# Patient Record
Sex: FEMALE | Race: WHITE | NOT HISPANIC OR LATINO | Employment: UNEMPLOYED | URBAN - METROPOLITAN AREA
[De-identification: names, ages, dates, MRNs, and addresses within clinical notes are randomized per-mention and may not be internally consistent; named-entity substitution may affect disease eponyms.]

---

## 2021-01-29 ENCOUNTER — OFFICE VISIT (OUTPATIENT)
Dept: OBSTETRICS AND GYNECOLOGY | Facility: CLINIC | Age: 36
End: 2021-01-29
Payer: COMMERCIAL

## 2021-01-29 VITALS
SYSTOLIC BLOOD PRESSURE: 120 MMHG | BODY MASS INDEX: 42.59 KG/M2 | DIASTOLIC BLOOD PRESSURE: 72 MMHG | WEIGHT: 265 LBS | HEIGHT: 66 IN

## 2021-01-29 DIAGNOSIS — Z01.419 WELL WOMAN EXAM WITH ROUTINE GYNECOLOGICAL EXAM: ICD-10-CM

## 2021-01-29 DIAGNOSIS — N93.9 ABNORMAL UTERINE BLEEDING (AUB): Primary | ICD-10-CM

## 2021-01-29 DIAGNOSIS — Z11.3 ROUTINE SCREENING FOR STI (SEXUALLY TRANSMITTED INFECTION): ICD-10-CM

## 2021-01-29 DIAGNOSIS — Z12.31 BREAST CANCER SCREENING BY MAMMOGRAM: ICD-10-CM

## 2021-01-29 PROCEDURE — 99385 PREV VISIT NEW AGE 18-39: CPT | Performed by: OBSTETRICS & GYNECOLOGY

## 2021-01-29 RX ORDER — PANTOPRAZOLE SODIUM 40 MG/1
40 TABLET, DELAYED RELEASE ORAL DAILY
COMMUNITY
End: 2024-01-30 | Stop reason: HOSPADM

## 2021-01-29 NOTE — PROGRESS NOTES
Visit Date: 1/29/2021   Anthony Tate is 35 y.o. female presenting today for Annual GYN Exam    HPI:   Last Menstrual Period: Patient's last menstrual period was 01/24/2021 (exact date).  Menstrual Cycle: Menstrual cycle is Regular. Approximate interval of 21-23 days. Bleeding lasts 5-6 days. Bleeding is Heavy. Patient has no bleeding <21 days. Patient does not have painful cramping during her periods.   Sexually Activity: Patient is sexually active with difficulty. Vaginal dryness, decreased lubrication, decreased libido. Recently found out  had an affair so also concerned about STI exposure and wants testing.  Contraception: Patient is using nothing for contraception and  is ok with this.  Bowel and Bladder function: Normal per patient.  Pap Smears: does not have a history of abnormal pap smears, Last pap was possible 2019 but is not sure.  Patient is known to be BRCA 1, tested due to maternal hx of breast cancer dx at age 39. Has not had a mammogram or MRI in several years.  Patient does have a family history of breast or gyn cancers.  Patient  does desire screening for STIs today.   Patient does not have a history of Domestic Violence/Verbal Abuse/Sexual Assault. She does feel safe in her current environment.     Past Medical History:  has a past medical history of BRCA positive, Breast cancer screening by mammogram (06/2019), GERD (gastroesophageal reflux disease), and Pap smear for cervical cancer screening (09/2019).    Past Surgical History:  has no past surgical history on file.    Medications:   Current Outpatient Medications:   •  pantoprazole (PROTONIX) 40 mg EC tablet, Take 40 mg by mouth daily., Disp: , Rfl:   •  MAGNESIUM ORAL, Take by mouth., Disp: , Rfl:   •  POTASSIUM ORAL, Take by mouth., Disp: , Rfl:       Allergies: has No Known Allergies.     Family History: family history includes Breast cancer (age of onset: 39) in her biological mother; Heart disease in her maternal grandfather and  "paternal grandfather; Ovarian cancer in her mother's sister; Skin cancer (age of onset: 40) in her biological mother; Thyroid cancer (age of onset: 45) in her biological mother.    Social History:  reports that she has quit smoking. She has never used smokeless tobacco. She reports previous alcohol use. She reports that she does not use drugs.    Review of Systems  Constitutional:   No hot flashes.   No night sweats.   No unexpected weight changes.  HENT:   No oral ulcers.   No headaches related to menstrual cycle.   Breasts:   No changes to skin of the breast or nipple.   No nipple discharge.   No breast lumps/cysts.   No breast pain.   Patient has not noticed any changes to breasts.   Respiratory:   No shortness of breath.  Cardiovascular:   No chest pain  Gastrointestinal:   No changes in bowel habits.  Genitourinary:   No pain with urination.   + urgency with urination.   No increased frequency of urination.   No urinary incontinence.   No vaginal discharge.   + vaginal dryness  No painful intercourse.   No genital sores.   No irregular menstrual cycles.   No heavy menstrual cycles.   No painful menstrual periods.   No bleeding between menstrual cycles.   + bleeding after intercourse sometimes  No absence of menstrual periods.  Integument:   No changes to any existing genital skin lesions or moles.   No new vaginal skin lesions or moles.   No genital rashes.   Endocrine:   No increased hair growth   Psychiatric:   No anxiety.   No depression.   No suicidal ideation.   Heme-Lymph:   No easy bruising.   No unexplained lumps.        Visit Vitals  /72 (BP Location: Left upper arm, Patient Position: Sitting)   Ht 1.676 m (5' 6\")   Wt 120 kg (265 lb)   LMP 01/24/2021 (Exact Date)   BMI 42.77 kg/m²       OBGyn Exam  General Appearance: Alert, cooperative, no acute distress  Head: Normocephalic, without obvious abnormality  Breast: Breasts: breasts appear normal, no suspicious masses, no skin or nipple changes or " axillary nodes.  Abdomen: Soft, nontender, nondistended,no masses, no organomegaly  Pelvic exam: VULVA: normal appearing vulva with no masses, tenderness or lesions. VAGINA: normal appearing vagina with normal color and discharge, no lesions. CERVIX: normal appearing cervix without discharge or lesions. UTERUS: uterus is normal size, shape, consistency and non-tender. ADNEXA: normal adnexa in size, nontender and no masses.    Assessment and Plan:  35 y.o. yo presents for an annual exam.   - Pap  with HPV collected today.  - Contraception: Patient not using contraception, recommend condoms for STI prevention as well. Discussed 's recent affair and she wants screening for all possible STIs.  GC/CT/Trich collected. HIV/RP/Hep B/C ordered to get at lab.   - AUB: reports periods fairly close together 21-23 days apart but severely heavy bleeding that is flooding. This has been ongoing since the birth of her last child. Will get pelvic US to assess for any abnormal structural lesions.   - BRCA 1 mutation: has not had any breast imaging done in a few years. Recommended mammogram ASAP and then staggering q6 months with breast MRI. She has a breast cancer doctor she sees, I ordered her mammo and asked her to follow up with them.   - Also discussed possibility of RR oophorectomy if she is completed child bearing. She is going to think about this and we will discuss further at next visit.   - Decreased libido: discussed multifactorial nature: relationship with  is having issues given recent affair, recommended therapy and jabier francisca when she is ready to use this. Also recommended lubricant for increased comfort during sex.   - PCB: occasional, not every encounter. Various etiologies of PCB include cytologic abnormalities of cervix, infectious causes, and trauma to tissue. Pap smear collected today to ensure no cytologic atypia. Swab collected to assess for infectious etiologies. No obvious trauma on exam.  - Patient  to call to schedule telemedicine visit in 6 weeks after US is completed    Meghna Strong, DO

## 2021-01-30 LAB
HBV SURFACE AG SERPL QL IA: NEGATIVE
HCV AB S/CO SERPL IA: <0.1 S/CO RATIO (ref 0–0.9)
HIV 1+2 AB+HIV1 P24 AG SERPL QL IA: NON REACTIVE

## 2021-02-01 LAB — TREPONEMA PALLIDUM IGG+IGM AB [PRESENCE] IN SERUM OR PLASMA BY IMMUNOASSAY: NON REACTIVE

## 2021-02-02 LAB
A VAGINAE DNA VAG QL NAA+PROBE: ABNORMAL SCORE
BVAB2 DNA VAG QL NAA+PROBE: ABNORMAL SCORE
C ALBICANS DNA VAG QL NAA+PROBE: NEGATIVE
C GLABRATA DNA VAG QL NAA+PROBE: POSITIVE
C TRACH DNA VAG QL NAA+PROBE: NEGATIVE
MEGA1 DNA VAG QL NAA+PROBE: ABNORMAL SCORE
N GONORRHOEA DNA VAG QL NAA+PROBE: NEGATIVE
T VAGINALIS DNA VAG QL NAA+PROBE: NEGATIVE

## 2021-02-03 LAB
CYTOLOGIST CVX/VAG CYTO: NORMAL
CYTOLOGY CVX/VAG DOC CYTO: NORMAL
CYTOLOGY CVX/VAG DOC THIN PREP: NORMAL
DX ICD CODE: NORMAL
HPV I/H RISK 4 DNA CVX QL PROBE+SIG AMP: NEGATIVE
LAB CORP NOTE:: NORMAL
OTHER STN SPEC: NORMAL
STAT OF ADQ CVX/VAG CYTO-IMP: NORMAL

## 2021-02-05 ENCOUNTER — HOSPITAL ENCOUNTER (OUTPATIENT)
Dept: RADIOLOGY | Facility: CLINIC | Age: 36
Discharge: HOME | End: 2021-02-05
Attending: OBSTETRICS & GYNECOLOGY
Payer: COMMERCIAL

## 2021-02-05 DIAGNOSIS — N93.9 ABNORMAL UTERINE BLEEDING (AUB): ICD-10-CM

## 2021-02-05 DIAGNOSIS — Z12.31 BREAST CANCER SCREENING BY MAMMOGRAM: ICD-10-CM

## 2021-02-05 PROCEDURE — 76830 TRANSVAGINAL US NON-OB: CPT

## 2021-02-05 PROCEDURE — 77067 SCR MAMMO BI INCL CAD: CPT

## 2021-02-09 ENCOUNTER — TRANSCRIBE ORDERS (OUTPATIENT)
Dept: REGISTRATION | Facility: CLINIC | Age: 36
End: 2021-02-09

## 2021-02-09 DIAGNOSIS — R92.8 OTHER ABNORMAL AND INCONCLUSIVE FINDINGS ON DIAGNOSTIC IMAGING OF BREAST: Primary | ICD-10-CM

## 2021-02-15 ENCOUNTER — HOSPITAL ENCOUNTER (OUTPATIENT)
Dept: RADIOLOGY | Facility: CLINIC | Age: 36
Discharge: HOME | End: 2021-02-15
Attending: RADIOLOGY
Payer: COMMERCIAL

## 2021-02-15 DIAGNOSIS — R92.8 OTHER ABNORMAL AND INCONCLUSIVE FINDINGS ON DIAGNOSTIC IMAGING OF BREAST: ICD-10-CM

## 2021-02-15 PROCEDURE — 77065 DX MAMMO INCL CAD UNI: CPT | Mod: RT

## 2021-02-15 PROCEDURE — 76642 ULTRASOUND BREAST LIMITED: CPT | Mod: RT

## 2021-02-18 ENCOUNTER — TELEMEDICINE (OUTPATIENT)
Dept: OBSTETRICS AND GYNECOLOGY | Facility: CLINIC | Age: 36
End: 2021-02-18
Payer: COMMERCIAL

## 2021-02-18 VITALS — BODY MASS INDEX: 41.46 KG/M2 | WEIGHT: 258 LBS | HEIGHT: 66 IN

## 2021-02-18 DIAGNOSIS — N93.9 ABNORMAL UTERINE BLEEDING (AUB): Primary | ICD-10-CM

## 2021-02-18 DIAGNOSIS — Z15.01 BRCA1 GENETIC CARRIER: ICD-10-CM

## 2021-02-18 DIAGNOSIS — Z15.09 BRCA1 GENETIC CARRIER: ICD-10-CM

## 2021-02-18 PROCEDURE — 99212 OFFICE O/P EST SF 10 MIN: CPT | Mod: 95 | Performed by: OBSTETRICS & GYNECOLOGY

## 2021-02-18 NOTE — PROGRESS NOTES
Verification of Patient Location:  The patient affirms they are currently located in the following state:[place]     Request for Consent:  You and I are about to have a telemedicine check-in or visit. This is allowed because you are already my patient, and you have requested it.  This telemedicine visit will be billed to your health insurance or you, if you are self-insured.  You understand you will be responsible for any copayments or coinsurances that apply to your telemedicine visit.  Before starting our telemedicine visit, I am required to get your consent for this virtual check-in or visit by telemedicine. Do you consent?      Patient Response to Request for Consent: Yes    The following have been reviewed and updated as appropriate in this visit:  Tobacco  Allergies  Meds  Problems  Med Hx  Surg Hx  Fam Hx  Soc Hx          Visit Documentation:    Patient ID: Anthony Tate   : 1985 35 y.o.  MRN: [id number]   Visit Date: 2021    Subjective   Anthony Tate is presenting today for follow up of Results    After annual visit, several issues were addressed and needing follow up today.     First, patient was told by  about an affair so STI testing was ordered. This was all negative which was reassuring to patient. We did discuss the time for seroconversion for blood STIs however she states the affair ended last March, and so her exposure would have been almost a year ago. At this point would only retest if she is told of a new exposure or wants routine screening.     Re: BRCA1 history: Had screening mammogram which was inconclusive. Diagnostic then showed two small complex breast cysts. US consistent with these findings. Recommendation from breast center radiologist was a 6 month follow up. This also falls in the window of when she should have a breast MRI done anyway due to BRCA status. Anthony hasn't seen her breast doctors in several years, but plans to return to their  care. (Previously saw Dr. Kateryna Riley who reportedly is no longer in the practice). I encouraged her to call so she can be set up for MRI in August 2021, and if she is unable to get into their office needs to let me know so I can refer her to breast onc here at [facility].     Re: menorrhagia: Pelvic US consistent with probable adenomyosis. EMS 13 mm. Given BMI 42, endometrial sampling is warranted to r/o hyperplasia as alternative cause of AUB. We discussed options for office EMB vs D&C hysteroscopy. She is comfortable proceeding with an office EMB. We will schedule this in the next several weeks. We also reviewed that pending results, would discuss management options for AUB. Will further address BRCA status and RR BSO once atypical hyperplasia is ruled out.     Patient comfortable with plan.     Meghna Strong DO            Time Spent:  I spent 23 minutes on this date of service performing the following activities: obtaining history, documenting, preparing for visit, obtaining / reviewing records, providing counseling and education, independently reviewing study/studies, communicating results and coordinating care.

## 2021-03-15 ENCOUNTER — PROCEDURE VISIT (OUTPATIENT)
Dept: OBSTETRICS AND GYNECOLOGY | Facility: CLINIC | Age: 36
End: 2021-03-15
Payer: COMMERCIAL

## 2021-03-15 VITALS
DIASTOLIC BLOOD PRESSURE: 64 MMHG | SYSTOLIC BLOOD PRESSURE: 118 MMHG | BODY MASS INDEX: 40.82 KG/M2 | HEIGHT: 66 IN | WEIGHT: 254 LBS

## 2021-03-15 DIAGNOSIS — N93.9 ABNORMAL UTERINE BLEEDING (AUB): Primary | ICD-10-CM

## 2021-03-15 LAB — PREGNANCY TEST URINE, POC: NEGATIVE

## 2021-03-15 PROCEDURE — 81025 URINE PREGNANCY TEST: CPT | Performed by: OBSTETRICS & GYNECOLOGY

## 2021-03-15 PROCEDURE — 58100 BIOPSY OF UTERUS LINING: CPT | Performed by: OBSTETRICS & GYNECOLOGY

## 2021-03-15 RX ORDER — TERCONAZOLE 4 MG/G
1 CREAM VAGINAL NIGHTLY
Qty: 45 G | Refills: 0 | Status: SHIPPED | OUTPATIENT
Start: 2021-03-15 | End: 2021-03-22

## 2021-03-15 NOTE — PROCEDURES
BX Endometrial    Date/Time: 3/15/2021 3:21 PM  Performed by: Hector Waters MD  Authorized by: Hector Waters MD     Consent:     Consent obtained:  Verbal and written    Consent given by:  Patient    Patient agrees, verbalizes understanding, and wants to proceed: yes    Procedure:     Uterus sound depth (cm):  7    Comments:     Procedure comments:  Patient presents for an endometrial biopsy per her plan with Dr. Strong.  Patient was comfortable proceeding.  Consent signed.  Negative pregnancy test was done here.  Pipelle performed without difficulty.  Uterine cavity sounded to 7 cm.  Patient tolerated the procedure well.  Specimen to pathology.  Will reassess management plan after results are known.  Instructions reviewed with the patient.    Patient was treated for Candida glabrata with 2 doses of Diflucan.  She had temporary relief of her symptoms but they quickly recurred.  Will treat with Terazol 7 once she has stopped bleeding from this procedure.  Patient understands and is comfortable with this plan.  Will reassess if symptoms recur.    NAB

## 2021-03-22 LAB
DX ICD CODE: NORMAL
PATH REPORT.FINAL DX SPEC: NORMAL
PATH REPORT.GROSS SPEC: NORMAL
PATH REPORT.SITE OF ORIGIN SPEC: NORMAL
PATHOLOGIST NAME: NORMAL
PAYMENT PROCEDURE: NORMAL

## 2022-12-21 ENCOUNTER — TELEPHONE (OUTPATIENT)
Dept: OBSTETRICS AND GYNECOLOGY | Facility: CLINIC | Age: 37
End: 2022-12-21
Payer: COMMERCIAL

## 2022-12-21 DIAGNOSIS — Z12.31 BREAST CANCER SCREENING BY MAMMOGRAM: Primary | ICD-10-CM

## 2022-12-21 DIAGNOSIS — R92.8 ABNORMAL MAMMOGRAM OF RIGHT BREAST: ICD-10-CM

## 2022-12-21 NOTE — LETTER
[facility]  [facility] at [place] [address]  [place] [address]  Phone:  [phone]  Fax:  [phone]    [facility] Account Number - [id number]  [facility] Account Number - [id number]      Patient:    Anthony Tate MRN:  [id number]   [address]  [place] [address] :  1985  Sex:  F   Phone: [phone]      INSURANCE PAYOR PLAN GROUP # SUBSCRIBER ID   Primary:   [employer] [id number]  Insurance: N/A  Plan Name: N/A [id number]  [id number]     Order Date:  Dec 21, 2022             BI DIAGNOSTIC MAMMOGRAM BILATERAL (TOMOSYNTHESIS)  CPT: 34736   (Order ID: [id number])   Diagnosis:  Breast cancer screening by mammogram (Z12.31)  Abnormal mammogram of right breast (R92.8)   Priority:  Routine  Release to patient: Immediate                       Authorized by: Minoo Arias MD   (NPI: [id number])    E-Signature: Minoo Arias MD                       To schedule radiology appointments with [facility]     call Central Scheduling at [phone]  To schedule PET scans call PET Scheduling [phone]    To schedule Low-Dose CT Scan for Lung Cancer Screening  call [phone]        Lab draws do not require an appointment

## 2022-12-21 NOTE — TELEPHONE ENCOUNTER
Last Mammo: approximate date 2/2021 and was abnormal: BIRADS 3  Last OV: 1/29/2021  Next OV: 3/17/2023    Mammo ordered per pt request.  Colleen Alcaraz RN

## 2023-01-23 ENCOUNTER — TELEPHONE (OUTPATIENT)
Dept: OBSTETRICS AND GYNECOLOGY | Facility: CLINIC | Age: 38
End: 2023-01-23
Payer: COMMERCIAL

## 2023-01-23 NOTE — TELEPHONE ENCOUNTER
Pt called to say that she never rec'd mammo rx that was requested to be mailed out. Another rx was printed and mailed after verifying pt address

## 2023-02-22 ENCOUNTER — HOSPITAL ENCOUNTER (OUTPATIENT)
Dept: RADIOLOGY | Age: 38
Discharge: HOME | End: 2023-02-22
Attending: OBSTETRICS & GYNECOLOGY
Payer: COMMERCIAL

## 2023-02-22 ENCOUNTER — HOSPITAL ENCOUNTER (OUTPATIENT)
Dept: RADIOLOGY | Age: 38
Discharge: HOME | End: 2023-02-22
Attending: RADIOLOGY
Payer: COMMERCIAL

## 2023-02-22 DIAGNOSIS — R92.8 ABNORMAL MAMMOGRAM OF RIGHT BREAST: ICD-10-CM

## 2023-02-22 DIAGNOSIS — Z12.31 BREAST CANCER SCREENING BY MAMMOGRAM: ICD-10-CM

## 2023-02-22 PROCEDURE — 76642 ULTRASOUND BREAST LIMITED: CPT | Mod: 50

## 2023-02-22 PROCEDURE — 77066 DX MAMMO INCL CAD BI: CPT

## 2023-03-13 ENCOUNTER — APPOINTMENT (RX ONLY)
Dept: URBAN - METROPOLITAN AREA CLINIC 28 | Facility: CLINIC | Age: 38
Setting detail: DERMATOLOGY
End: 2023-03-13

## 2023-03-13 DIAGNOSIS — L91.8 OTHER HYPERTROPHIC DISORDERS OF THE SKIN: ICD-10-CM

## 2023-03-13 DIAGNOSIS — L72.0 EPIDERMAL CYST: ICD-10-CM

## 2023-03-13 DIAGNOSIS — L57.0 ACTINIC KERATOSIS: ICD-10-CM

## 2023-03-13 DIAGNOSIS — L24.9 IRRITANT CONTACT DERMATITIS, UNSPECIFIED CAUSE: ICD-10-CM | Status: INADEQUATELY CONTROLLED

## 2023-03-13 PROCEDURE — 17003 DESTRUCT PREMALG LES 2-14: CPT | Mod: 59

## 2023-03-13 PROCEDURE — ? COUNSELING

## 2023-03-13 PROCEDURE — 99204 OFFICE O/P NEW MOD 45 MIN: CPT | Mod: 25

## 2023-03-13 PROCEDURE — 17110 DESTRUCTION B9 LES UP TO 14: CPT

## 2023-03-13 PROCEDURE — 17000 DESTRUCT PREMALG LESION: CPT | Mod: 59

## 2023-03-13 PROCEDURE — ? LIQUID NITROGEN

## 2023-03-13 PROCEDURE — ? ACNE SURGERY

## 2023-03-13 PROCEDURE — ? PRESCRIPTION MEDICATION MANAGEMENT

## 2023-03-13 PROCEDURE — ? BENIGN DESTRUCTION

## 2023-03-13 PROCEDURE — 10040 EXTRACTION: CPT

## 2023-03-13 ASSESSMENT — LOCATION DETAILED DESCRIPTION DERM
LOCATION DETAILED: LEFT MEDIAL CANTHUS
LOCATION DETAILED: RIGHT INFERIOR VERMILION LIP
LOCATION DETAILED: LEFT INFERIOR VERMILION LIP
LOCATION DETAILED: LEFT MEDIAL EYEBROW

## 2023-03-13 ASSESSMENT — LOCATION ZONE DERM
LOCATION ZONE: LIP
LOCATION ZONE: EYELID
LOCATION ZONE: FACE

## 2023-03-13 ASSESSMENT — LOCATION SIMPLE DESCRIPTION DERM
LOCATION SIMPLE: LEFT EYELID
LOCATION SIMPLE: LEFT EYEBROW
LOCATION SIMPLE: RIGHT LIP
LOCATION SIMPLE: LEFT LIP

## 2023-03-13 NOTE — PROCEDURE: ACNE SURGERY
Extraction Method: comedo extractor
Acne Type: Cysts
Detail Level: Zone
Consent was obtained and risks were reviewed including but not limited to scarring, infection, bleeding, scabbing, incomplete removal, and allergy to anesthesia.
Post-Care Instructions: I reviewed with the patient in detail post-care instructions. Patient is to keep the treatment areas dry overnight, and then apply bacitracin twice daily until healed. Patient may apply hydrogen peroxide soaks to remove any crusting.
Render Post-Care Instructions In Note?: no
Prep Text (Optional): Prior to removal the treatment areas were prepped in the usual fashion.
Render Number Of Lesions Treated: yes
- - -

## 2023-03-13 NOTE — PROCEDURE: BENIGN DESTRUCTION
Anesthesia Volume In Cc: 0.5
Detail Level: Detailed
Post-Care Instructions: I reviewed with the patient in detail post-care instructions. Patient is to wear sunprotection, and avoid picking at any of the treated lesions. Pt may apply Vaseline to crusted or scabbing areas.
Include Z78.9 (Other Specified Conditions Influencing Health Status) As An Associated Diagnosis?: No
Treatment Number (Will Not Render If 0): 0
Consent: The patient's consent was obtained including but not limited to risks of crusting, scabbing, blistering, scarring, darker or lighter pigmentary change, recurrence, incomplete removal and infection.
Medical Necessity Clause: This procedure was medically necessary because the lesions that were treated were:
Medical Necessity Information: It is in your best interest to select a reason for this procedure from the list below. All of these items fulfill various CMS LCD requirements except the new and changing color options.

## 2023-03-13 NOTE — PROCEDURE: PRESCRIPTION MEDICATION MANAGEMENT
Detail Level: Zone
Render In Strict Bullet Format?: No
Plan: T/C wetting lips and using plain Vaseline

## 2023-03-17 ENCOUNTER — OFFICE VISIT (OUTPATIENT)
Dept: OBSTETRICS AND GYNECOLOGY | Facility: CLINIC | Age: 38
End: 2023-03-17
Payer: COMMERCIAL

## 2023-03-17 VITALS
WEIGHT: 245 LBS | HEIGHT: 66 IN | SYSTOLIC BLOOD PRESSURE: 124 MMHG | BODY MASS INDEX: 39.37 KG/M2 | DIASTOLIC BLOOD PRESSURE: 60 MMHG

## 2023-03-17 DIAGNOSIS — Z15.01 BRCA1 POSITIVE: ICD-10-CM

## 2023-03-17 DIAGNOSIS — N92.0 MENORRHAGIA WITH REGULAR CYCLE: ICD-10-CM

## 2023-03-17 DIAGNOSIS — Z15.09 BRCA1 POSITIVE: ICD-10-CM

## 2023-03-17 DIAGNOSIS — Z01.419 WELL WOMAN EXAM WITH ROUTINE GYNECOLOGICAL EXAM: Primary | ICD-10-CM

## 2023-03-17 DIAGNOSIS — Z12.31 BREAST CANCER SCREENING BY MAMMOGRAM: ICD-10-CM

## 2023-03-17 PROCEDURE — 99213 OFFICE O/P EST LOW 20 MIN: CPT | Performed by: OBSTETRICS & GYNECOLOGY

## 2023-03-17 PROCEDURE — 99395 PREV VISIT EST AGE 18-39: CPT | Mod: 25 | Performed by: OBSTETRICS & GYNECOLOGY

## 2023-03-17 PROCEDURE — 3008F BODY MASS INDEX DOCD: CPT | Performed by: OBSTETRICS & GYNECOLOGY

## 2023-03-17 NOTE — PROGRESS NOTES
Visit Date: 3/17/2023   Anthony Tate is 37 y.o. female presenting today for Annual GYN Exam    HPI:  Last Menstrual Period: Patient's last menstrual period was 03/05/2023.  Menstrual Cycle: Menstrual cycle is Regular. Approximate interval of 28-30 days. Bleeding lasts 6-7 days. Bleeding isvery Heavy. Changes a pad or tampon every time she urinates, every 1-2 hrs. Patient has no bleeding <21 days. Patient does not have painful cramping during her periods.   Sexually Activity: not at this time   Bowel and Bladder function: Normal per patient.   Pap Smears: does not have a history of abnormal pap smears, Last pap was in 2/2021 and was neg/neg.   Patient  does report left lateral breast pain that occurred recently, however following her breast ultrasound the pain spontaneously improved.  Patient does have a family history of breast or gyn cancers. She is BRCA 1.   Patient  does not desire screening for STIs today.   Patient does not have a history of Domestic Violence/Verbal Abuse/Sexual Assault. She does feel safe in her current environment.     Past Medical History:  has a past medical history of BRCA positive, Breast cancer screening by mammogram (02/22/2023), GERD (gastroesophageal reflux disease), diagnostic mammography (02/22/2023), and Pap smear for cervical cancer screening (02/03/2021).    Past Surgical History:  has no past surgical history on file.    Medications:   Current Outpatient Medications:   •  MAGNESIUM ORAL, Take by mouth., Disp: , Rfl:   •  pantoprazole (PROTONIX) 40 mg EC tablet, Take 40 mg by mouth daily., Disp: , Rfl:   •  POTASSIUM ORAL, Take by mouth., Disp: , Rfl:       Allergies: has No Known Allergies.     Family History: family history includes Breast cancer (age of onset: 39) in her biological mother; Heart disease in her maternal grandfather and paternal grandfather; Ovarian cancer in her mother's sister; Skin cancer (age of onset: 40) in her biological mother; Thyroid cancer  "(age of onset: 45) in her biological mother.    Social History:   Social History     Tobacco Use   • Smoking status: Former   • Smokeless tobacco: Never   Vaping Use   • Vaping status: Never Used   Substance Use Topics   • Alcohol use: Not Currently   • Drug use: Never       Review of Systems  Constitutional:   No hot flashes.   No night sweats.   No unexpected weight changes.  HENT:   No oral ulcers.   No headaches related to menstrual cycle.   Breasts:   No changes to skin of the breast or nipple.   No nipple discharge.   No breast lumps/cysts.   + breast pain.   Patient has not noticed any changes to breasts.   Respiratory:   No shortness of breath.  Cardiovascular:   No chest pain  Gastrointestinal:   No changes in bowel habits.  Genitourinary:   No pain with urination.   No urgency with urination.   No increased frequency of urination.   No urinary incontinence.   No vaginal discharge.   No painful intercourse.   No genital sores.   No irregular menstrual cycles.   + heavy menstrual cycles.   No painful menstrual periods.   No bleeding between menstrual cycles.   No bleeding after intercourse.  No absence of menstrual periods.  Integument:   No changes to any existing genital skin lesions or moles.   No new vaginal skin lesions or moles.   No genital rashes.   Endocrine:   No increased hair growth   Psychiatric:   No anxiety.   No depression.   No suicidal ideation.   Heme-Lymph:   No easy bruising.   No unexplained lumps.        Visit Vitals  /60 (BP Location: Right upper arm, Patient Position: Sitting)   Ht 1.676 m (5' 6\")   Wt 111 kg (245 lb)   LMP 03/05/2023   BMI 39.54 kg/m²       OBGyn Exam  General Appearance: Alert, cooperative, no acute distress  Head: Normocephalic, without obvious abnormality  Breast: Breasts: breasts appear normal, no suspicious masses, no skin or nipple changes or axillary nodes.  Abdomen: Soft, nontender, nondistended,no masses, no organomegaly  Pelvic exam: VULVA: normal " appearing vulva with no masses, tenderness or lesions. VAGINA: normal appearing vagina with normal color and discharge, no lesions. CERVIX: normal appearing cervix without discharge or lesions. UTERUS: uterus is normal size, shape, consistency and non-tender. ADNEXA: normal adnexa in size, nontender and no masses. Exam limited due to habitus    Assessment and Plan:  37 y.o. yo presents for an annual exam.   - Pap  not collected today.  - Contraception: n/a  - STI Testing: GC/CT/Trich declined. HIV/Syphillis declined.  - BRCA 1 carrier: Anthony is now up to date on her mammograms as she had this done last month, BIRAD 2. However, she still has not gotten scheduled for her annual MRI as we have previously discussed. She was planning to establish with her prior breast oncologists but has not done so yet. I suggested that she see our breast surgeons at [facility] to establish care for surveillance going forward. Contact information provided. In the interim, I have ordered her a breast MRI to be done 6 months from her prior mammogram. She may be interested in mastectomy at some point but would like to have her risk reducing GYN surgery first as noted below.  - Menorrhagia/BRCA 1 carrier: Again reviewed risks of ovarian cancer, fallopian tube cancer and primary peritoneal cancer in women with BRCA 1. She has previously been counseled regarding RRBSO between 35-40 years old. She is definitively done with child bearing and therefore would like to have this procedure done. Additionally, Anthony has menorrhagia that has been ongoing since the birth of her last child. We previously completed an EMB for her which was benign. Her US indicated adenomyosis so I suspect this is contributory to her heavy cycles. She would like definitive management of this with hysterectomy at the time of RRBSO. We will work on scheduling this, would plan for robotic assisted TLH/BSO. We did review that in the absence of HRT, she would undergo  abrupt surgical menopause. In addition to being symptomatic, at 37 years old this would increase her risk of heart disease as well as osteoporosis. As such, I would recommend that we put her on estrogen HRT following surgery, likely in the form of a patch. She had some questions about breast cancer risk in this scenario and plans to further discuss this component of our plan with breast oncology when she establishes care. My office will call her with some possible surgery dates.  - Return to office 1 year or prn  - Patient to check MyChart for results in 7-10 days. She is aware to call the office with any questions.    Meghna Strong, DO

## 2023-05-31 ENCOUNTER — TELEPHONE (OUTPATIENT)
Dept: OBSTETRICS AND GYNECOLOGY | Facility: CLINIC | Age: 38
End: 2023-05-31
Payer: COMMERCIAL

## 2023-08-08 ENCOUNTER — PREP FOR CASE (OUTPATIENT)
Dept: OBSTETRICS AND GYNECOLOGY | Facility: CLINIC | Age: 38
End: 2023-08-08
Payer: COMMERCIAL

## 2023-08-08 ENCOUNTER — TELEPHONE (OUTPATIENT)
Dept: OBSTETRICS AND GYNECOLOGY | Facility: CLINIC | Age: 38
End: 2023-08-08
Payer: COMMERCIAL

## 2023-08-08 DIAGNOSIS — N92.0 MENORRHAGIA WITH REGULAR CYCLE: Primary | ICD-10-CM

## 2023-08-08 NOTE — TELEPHONE ENCOUNTER
Called pt to offer procedure date of 12/28/23. Pt declined date and asked if there is something for January. Pt was informed that she will receive a call back with another date      Pt called back to accept offered date of 12/28

## 2023-11-02 ENCOUNTER — TELEPHONE (OUTPATIENT)
Dept: OBSTETRICS AND GYNECOLOGY | Facility: CLINIC | Age: 38
End: 2023-11-02
Payer: COMMERCIAL

## 2023-11-06 ENCOUNTER — TELEPHONE (OUTPATIENT)
Dept: OBSTETRICS AND GYNECOLOGY | Facility: CLINIC | Age: 38
End: 2023-11-06
Payer: COMMERCIAL

## 2023-11-06 NOTE — TELEPHONE ENCOUNTER
Patient called requesting a call back patient wanted to know if she need to have a breast MRI done

## 2023-11-06 NOTE — TELEPHONE ENCOUNTER
I spoke with Anthony. She states she found an MRI script but was not sure when she was supposed to have it done. I explained that ELS wanted her to have it done 6 months from her mammogram (which would have been August). She has not yet scheduled an appt with a breast specialist, but states she is established with one. I suggested she contact her current breast specialist for MRI/precert since she is established with them and so they can facilitate the precert. She will do this.

## 2023-11-10 ENCOUNTER — TELEPHONE (OUTPATIENT)
Dept: OBSTETRICS AND GYNECOLOGY | Facility: CLINIC | Age: 38
End: 2023-11-10
Payer: COMMERCIAL

## 2023-11-10 NOTE — TELEPHONE ENCOUNTER
Patient called in stating that she was told from central scheduling that she requires a prior auth for her MRI Mammogram. I explained to her that I Colleen and Toña are out of the office today. But I would leave a message so that it can looked into and know if a prior auth is required. Patient is ok with this.

## 2023-12-01 ENCOUNTER — CONSULT (OUTPATIENT)
Dept: OBSTETRICS AND GYNECOLOGY | Facility: CLINIC | Age: 38
End: 2023-12-01
Payer: COMMERCIAL

## 2023-12-01 VITALS
BODY MASS INDEX: 40.18 KG/M2 | WEIGHT: 250 LBS | DIASTOLIC BLOOD PRESSURE: 62 MMHG | HEIGHT: 66 IN | SYSTOLIC BLOOD PRESSURE: 122 MMHG

## 2023-12-01 DIAGNOSIS — Z01.818 PRE-OP TESTING: Primary | ICD-10-CM

## 2023-12-01 PROCEDURE — 3008F BODY MASS INDEX DOCD: CPT | Performed by: OBSTETRICS & GYNECOLOGY

## 2023-12-01 PROCEDURE — 99213 OFFICE O/P EST LOW 20 MIN: CPT | Mod: 57 | Performed by: OBSTETRICS & GYNECOLOGY

## 2023-12-01 NOTE — PROGRESS NOTES
Patient ID: Anthony Tate   : 1985 38 y.o.  MRN: [id number]   Visit Date: 2023    Subjective   Anthony Tate is presenting today for  Pre-op visit    Anthony has a history of BRCA1 mutation and has elected to undergo RRBSO for the purpose of ovarian cancer risk reduction. Additionally, she has heavy menses likely related to her adenomyosis. Prior endometrial sampling negative. She elects for hysterectomy at the time of RRBSO for the purpose of definitive management of her menorrhagia. She has definitively completed childbearing and does not desire future fertility.     Past Medical History:  has a past medical history of BRCA positive, Breast cancer screening by mammogram (2023), GERD (gastroesophageal reflux disease), diagnostic mammography (2023), and Pap smear for cervical cancer screening (2021).     Past Surgical History:  has no past surgical history on file.    Obstetric History:   OB History    Para Term  AB Living   3 3 3     3   SAB IAB Ectopic Multiple Live Births           3      # Outcome Date GA Lbr Bro/2nd Weight Sex Delivery Anes PTL Lv   3 Term 10/04/15 39w0d  3175 g (7 lb) F Vag-Spont   SOY   2 Term 14 39w0d  3175 g (7 lb) F Vag-Spont   SOY   1 Term 02 41w0d  2722 g (6 lb) F Vag-Spont   SOY       Family History: family history includes Breast cancer (age of onset: 39) in her biological mother; Heart disease in her maternal grandfather and paternal grandfather; Ovarian cancer in her mother's sister; Skin cancer (age of onset: 40) in her biological mother; Thyroid cancer (age of onset: 45) in her biological mother.    Medications:   Current Outpatient Medications:   •  MAGNESIUM ORAL, Take by mouth., Disp: , Rfl:   •  pantoprazole (PROTONIX) 40 mg EC tablet, Take 40 mg by mouth daily., Disp: , Rfl:   •  POTASSIUM ORAL, Take by mouth., Disp: , Rfl:       Allergies: has No Known Allergies.     All relevant medical record  "documentation and testing reviewed.    Vital Signs for this encounter:   Visit Vitals  /62 (BP Location: Right upper arm, Patient Position: Sitting)   Ht 1.676 m (5' 6\")   Wt 113 kg (250 lb)   LMP 11/24/2023   BMI 40.35 kg/m²       OBGyn Exam  General Appearance: Alert, cooperative, no acute distress  Head: Normocephalic, without obvious abnormality  Breast: Deferred  Abdomen: Soft, nontender, nondistended,no masses, no organomegaly  Pelvic exam: deferred  Extremities: no edema    Impression/Plan:    38 y.o. is presenting today for pre-op visit     - Discussed medications she takes and holding them the morning of surgery. NPO after midnight the night before. She desires same day discharge to home post surgery. If no complications this is reasonable.  - We discussed that in doing her oophorectomy this will be putting her into surgical menopause. As such, for the purpose of symptom management, cardiovascular protection and bone health, I recommend post operative HRT. She is agreeable to this and understands rationale. We discussed the various ways this can be given, via oral formulation vs transversally.  - I personally counseled the patient on the risk of surgery which includes but is not limited to: Infection, bleeding, conversion to laparotomy, wound breakdown, injury to visceral organs such as bowel, bladder, ureters, blood vessels, nerves, VTE, MI, stroke, need for blood transfusion, and other unforeseen circumstances.  She is at increased risk of dequan-operative complications due to obesity.  All of her questions were answered to her satisfaction, and informed consent was signed.  - T&S, CBC, BMP ordered for PAT blood work,she will complete prior to her procedure.    Meghna Strong, DO  "

## 2023-12-08 ENCOUNTER — APPOINTMENT (OUTPATIENT)
Dept: LAB | Facility: HOSPITAL | Age: 38
End: 2023-12-08
Attending: OBSTETRICS & GYNECOLOGY
Payer: COMMERCIAL

## 2023-12-08 DIAGNOSIS — Z01.818 PRE-OP TESTING: ICD-10-CM

## 2023-12-08 PROBLEM — Z15.01 BRCA1 GENE MUTATION POSITIVE IN FEMALE: Status: ACTIVE | Noted: 2023-08-08

## 2023-12-08 PROBLEM — Z15.02 BRCA1 GENE MUTATION POSITIVE IN FEMALE: Status: ACTIVE | Noted: 2023-08-08

## 2023-12-08 PROBLEM — Z15.09 BRCA1 GENE MUTATION POSITIVE IN FEMALE: Status: ACTIVE | Noted: 2023-08-08

## 2023-12-08 LAB
ABO + RH BLD: NORMAL
ANION GAP SERPL CALC-SCNC: 9 MEQ/L (ref 3–15)
BLD GP AB SCN SERPL QL: NEGATIVE
BUN SERPL-MCNC: 14 MG/DL (ref 7–25)
CALCIUM SERPL-MCNC: 9.5 MG/DL (ref 8.6–10.3)
CHLORIDE SERPL-SCNC: 104 MEQ/L (ref 98–107)
CO2 SERPL-SCNC: 24 MEQ/L (ref 21–31)
CREAT SERPL-MCNC: 0.7 MG/DL (ref 0.6–1.2)
D AG BLD QL: NEGATIVE
EGFRCR SERPLBLD CKD-EPI 2021: >60 ML/MIN/1.73M*2
ERYTHROCYTE [DISTWIDTH] IN BLOOD BY AUTOMATED COUNT: 13.2 % (ref 11.7–14.4)
GLUCOSE SERPL-MCNC: 89 MG/DL (ref 70–99)
HCT VFR BLDCO AUTO: 40.7 % (ref 35–45)
HGB BLD-MCNC: 13.2 G/DL (ref 11.8–15.7)
LABORATORY COMMENT REPORT: NORMAL
MCH RBC QN AUTO: 27.1 PG (ref 28–33.2)
MCHC RBC AUTO-ENTMCNC: 32.4 G/DL (ref 32.2–35.5)
MCV RBC AUTO: 83.6 FL (ref 83–98)
PDW BLD AUTO: 10.2 FL (ref 9.4–12.3)
PLATELET # BLD AUTO: 298 K/UL (ref 150–369)
POTASSIUM SERPL-SCNC: 4.1 MEQ/L (ref 3.5–5.1)
RBC # BLD AUTO: 4.87 M/UL (ref 3.93–5.22)
SODIUM SERPL-SCNC: 137 MEQ/L (ref 136–145)
WBC # BLD AUTO: 7.18 K/UL (ref 3.8–10.5)

## 2023-12-08 PROCEDURE — 85027 COMPLETE CBC AUTOMATED: CPT

## 2023-12-08 PROCEDURE — 86850 RBC ANTIBODY SCREEN: CPT

## 2023-12-08 PROCEDURE — 86900 BLOOD TYPING SEROLOGIC ABO: CPT

## 2023-12-08 PROCEDURE — 82310 ASSAY OF CALCIUM: CPT

## 2023-12-08 PROCEDURE — 36415 COLL VENOUS BLD VENIPUNCTURE: CPT

## 2023-12-27 ENCOUNTER — PREP FOR CASE (OUTPATIENT)
Dept: OBSTETRICS AND GYNECOLOGY | Facility: CLINIC | Age: 38
End: 2023-12-27
Payer: COMMERCIAL

## 2023-12-27 ENCOUNTER — APPOINTMENT (OUTPATIENT)
Dept: PREADMISSION TESTING | Facility: HOSPITAL | Age: 38
End: 2023-12-27
Payer: COMMERCIAL

## 2023-12-27 ENCOUNTER — ANESTHESIA EVENT (OUTPATIENT)
Dept: OPERATING ROOM | Facility: HOSPITAL | Age: 38
Setting detail: HOSPITAL OUTPATIENT SURGERY
End: 2023-12-27
Payer: COMMERCIAL

## 2023-12-27 RX ORDER — TRAMADOL HYDROCHLORIDE 50 MG/1
50 TABLET ORAL
Status: CANCELLED | OUTPATIENT
Start: 2023-12-27

## 2023-12-27 RX ORDER — GABAPENTIN 100 MG/1
600 CAPSULE ORAL
Status: CANCELLED | OUTPATIENT
Start: 2023-12-27

## 2023-12-27 RX ORDER — ACETAMINOPHEN 325 MG/1
975 TABLET ORAL
Status: CANCELLED | OUTPATIENT
Start: 2023-12-27

## 2023-12-27 RX ORDER — CELECOXIB 100 MG/1
200 CAPSULE ORAL
Status: CANCELLED | OUTPATIENT
Start: 2023-12-27

## 2023-12-27 RX ORDER — METRONIDAZOLE 500 MG/100ML
500 INJECTION, SOLUTION INTRAVENOUS
Status: CANCELLED | OUTPATIENT
Start: 2023-12-27 | End: 2023-12-27

## 2023-12-27 RX ORDER — SODIUM CHLORIDE, SODIUM GLUCONATE, SODIUM ACETATE, POTASSIUM CHLORIDE AND MAGNESIUM CHLORIDE 30; 37; 368; 526; 502 MG/100ML; MG/100ML; MG/100ML; MG/100ML; MG/100ML
80 INJECTION, SOLUTION INTRAVENOUS CONTINUOUS
Status: CANCELLED | OUTPATIENT
Start: 2023-12-27 | End: 2023-12-28

## 2023-12-27 NOTE — ANESTHESIA PREPROCEDURE EVALUATION
Relevant Problems   No relevant active problems       Anesthesia ROS/MED HX    Anesthesia History    Previous anesthetics  Pulmonary - neg  Neuro/Psych - neg  Cardiovascular- neg  GI/Hepatic   GERD  Endo/Other  Body Habitus: Overweight  ROS/MED HX Comments:    Endo: BRCA gene mutation       No past surgical history on file.    Physical Exam    Airway   Mallampati: III   TM distance: >3 FB   Neck ROM: full  Cardiovascular - normal   Rhythm: regular   Rate: normalPulmonary - normal   clear to auscultation  Dental    Teeth Problems: missing and chipped          Anesthesia Plan    Plan: general    Technique: general endotracheal     Lines and Monitors: additional IV     Airway: oral intubation and video laryngoscope   2 ASA  Blood Products:     Use of Blood Products Discussed: Yes     Consented to blood products  Anesthetic plan and risks discussed with: patient  Induction:    intravenous

## 2023-12-27 NOTE — PRE-PROCEDURE INSTRUCTIONS
1. We will call you between 3 pm and 7 pm on December 27, 2023 to determine that arrival time for your procedure. If you do not hear by 6PM. Please call [phone] for arrival time.     2. Please report to Main Entrance near Parking lot A, walk into main lobby and report to the admission desk on the first floor on the day of your procedure.    3. Please follow the following fasting guidelines:     No solid food EIGHT HOURS prior to surgery.  FOLLOW FASTING GUIDELINES PROVIDED BY MD OFFICE .    4. Please take ONLY the following medications with a sip of water on the morning of your procedure:     FOLLOW MEDICATION GUIDELINES PROVIDED BY MD OFFICE PRIOR TO PROCEDURE      NO ADDITIONAL NSAIDS, VITAMINS OR SUPPLEMENTS PRIOR TO PROCEDURE,    TYLENOL ACCEPTABLE IF NEEDED.  \  5. Other Instructions: You may brush your teeth the morning of the procedure. Rinse and spit, do not swallow.  Bring a list of your medications with dosages.  Use surgical wash as directed.     6. If you develop a cold, cough, fever, rash, or other symptom prior to the day of the procedure, please report it to your physician immediately.    7. If you need to cancel the procedure for any reason, please contact your physician.    8. Make arrangements to have safe transportation home accompanied by a responsible adult. If you have not arranged safe transportation home, your surgery will be cancelled. Safe transportation may include private vehicle, ride-share service, taxi and public transportation when accompanied by a responsible adult who will assist you home. A responsible adult is someone known to you and does not include the taxi, ride-share or public transit drive transporting you.    9. You may not take public transportation unless accompanied by a responsible person.    10. You may not drive a car or operate complex or potentially dangerous machinery for 24 hours following anesthesia and/or sedation.    11.  If it is medically necessary for  you to have a longer stay, you will be informed as soon as the decision is made.    12. Only bring essential items to the hospital.  Do not wear or bring anything of value to the hospital including jewelry of any kind, money, or wallet. Do not wear make-up or contact lenses.   DO NOT BRING MEDICATIONS FROM HOME unless instructed to do so. DO bring your hearing aids, glasses, and a case    13. No lotion, creams, powders, or oils on skin once you start the surgical wash or Dial soap.        14. Dress in comfortable clothes.    15.  If instructed, please bring a copy of your Advanced Directive (Living Will/Durable Power of ) on the day of your procedure.     16. Ensuring your safety at all times is a very important part of our [facility] Culture of Safety. After having surgery and sedation, you are at risk for falling and balance issues. Although you may feel awake, the effects of the medication can last up to 24 hours after anesthesia. If you need to use the bathroom during your recovery period, nursing staff will escort you there and stay with you to ensure your safety.    17. Refrain from drinking alcohol, Smoking cigarettes or marijuana for 24 hours prior to surgery.    18. Shower with antibacterial soap (Dial) the night before and morning of your procedure.  If your procedure indicates the need for CHG antiseptic wash (Bactoshield or Hibiclens), please use this instead and follow instructions as discussed at the time of your Pre-Admission Testing phone interview or visit.    Above instructions reviewed with patient and patient acknowledges understanding.       [facility]   Patient Education Preoperative Showers     Good Hygiene, such as frequent handwashing and daily skin cleansing, promotes good health.  Daily skin cleansing helps remove germs that may cause infections. The following instructions should be followed to help reduce germs on your skin prior to your surgical procedure.      · Bactoshield/Hibiclens CHG 4% is an antiseptic soap.  The active ingredient is chlorhexidine gluconate. Do not use this product if you are allergic to chlorhexidine gluconate.     · The NIGHT before and the MORNING of your procedure , shower or bathe with Bactoshield. This product should replace your regular soap used for cleansing most of your body surfaces. Bactoshield should not be used on your head or face: keep out of your eyes, ears, and mouth.      · If you plan to wash your hair, do so with your regular shampoo. Then, rinse the hair and your body thoroughly to move any shampoo residue.     · Wash face with regular soap and water only.     · Wash your genital area with soap and water only.    · Thoroughly rinse your body with warm water from the neck down.       · Apply the minimum amount of Bactoshield to cover the skin. Use this product as you would any liquid soap. Leave this on for 2 minutes. Note- Bactoshield DOES NOT LATHER like normal soap.      · Wash the skin gently and rinse thoroughly with warm water. You do not need to scrub the skin to remove germs.      · Do not use regular soap after you have applied and rinsed the Bactoshield.  Change into clean clothes after each shower.     · Do not apply any lotion, powder, or perfumes to the body areas that have been cleansed with Bactoshield.     · No use of hair removal products or shaving at or near the surgical site 48 hours before your procedure. (72 hours for cardiac patients.)    · For those having perineal area surgeries (ie: vaginal, rectal or cystoscopy) - please use Dial soap.          Why do we cleanse the skin prior to surgery?   There are lots of germs on our skin and in our environment. Most are harmless, some are even helpful on our skin and in our environment. As part of your preparation you will be asked to purchase a bottle of antibacterial soap, Bactoshield CHG 4% or Hibiclens CHG4% to cleanse your skin and eliminate a certain  bacteria called, Staphylococcus Aureus.      What is Staphylococcus aureus? (Staph)   Staph is a common germ found on the skin. One-third of healthy people carry this germ. Methicillin-resistant Staphylococcus aureus (MRSA) is a type of Staph bacteria that is resistant to certain antibiotics. In the community, most MRSA infections are skin infections.  People who have Staph/MRSA may show no signs of illness- this is called colonization. Caution needs to be used when you come into the hospital for a surgical procedure to ensure that Staph does not enter your body.     Am I at increased risk for infection if I am carrying Staph ?   Because Staph is carried on your skin, you may be at increased risk for developing a surgical infection. To reduce the presence of Staph on your skin, we recommend a series of preoperative showers with an antibacterial skin cleanser. These showers are to be done the NIGHT BEFORE your surgery and the MORNING of your surgery.

## 2023-12-27 NOTE — H&P (VIEW-ONLY)
Gynecology History and Physical    HPI     Patient is a 38 y.o. female who presents for RA-TLH, BSO, cysto for definitive management of menorrhagia likely related to adenomyosis as well as risk reducing surgery for her BRCA1 mutation. She has undergone counseling regarding the impact of surgical menopause and need for post operative HRT as well as the sterilization nature of this procedure. She understands and has no desires for future fertility.    OB History:   OB History    Para Term  AB Living   3 3 3 0 0 3   SAB IAB Ectopic Multiple Live Births   0 0 0 0 3      # Outcome Date GA Lbr Bro/2nd Weight Sex Delivery Anes PTL Lv   3 Term 10/04/15 39w0d  3175 g (7 lb) F Vag-Spont   SOY   2 Term 14 39w0d  3175 g (7 lb) F Vag-Spont   SOY   1 Term 02 41w0d  2722 g (6 lb) F Vag-Spont   SOY       Medical History:   Past Medical History:   Diagnosis Date   • BRCA positive     thinks BRCA 1   • Breast cancer screening by mammogram 2023    BIRADS CATEGORY 2 - BENIGN FINDING (NOR NC D)HETEROGENEOUS   • GERD (gastroesophageal reflux disease)    • Hx of diagnostic mammography 2023    BIRADS CATEGORY 2 - BENIGN FINDING (NOR NC D)HETEROGENEOUS   • Pap smear for cervical cancer screening 2021    neg/hpv neg       Surgical History: No past surgical history on file.    Social History:   Social History     Social History Narrative   • Not on file       Family History:   Family History   Problem Relation Age of Onset   • Heart disease Paternal Grandfather    • Heart disease Maternal Grandfather    • Breast cancer Biological Mother 39        BRCA+   • Thyroid cancer Biological Mother 45   • Skin cancer Biological Mother 40   • Ovarian cancer Mother's Sister    • Colon cancer Neg Hx    • Cervical cancer Neg Hx    • Uterine cancer Neg Hx        Allergies: Patient has no known allergies.    Prior to Admission medications    Medication Sig Start Date End Date Taking? Authorizing Provider    MAGNESIUM ORAL Take by mouth.    Provider, MD Mihcael   pantoprazole (PROTONIX) 40 mg EC tablet Take 40 mg by mouth daily.    Provider, MD Michael   POTASSIUM ORAL Take by mouth.    Provider, MD Michael       Review of Systems  Pertinent items are noted in HPI.    Objective     Vital Signs for the last 24 hours:   Upon admission    Exam  General Appearance: Alert, cooperative, no acute distress  Head: Normocephalic, without obvious abnormality  Heart: RRR  Lung: CTA BL  Breast: Deferred  Abdomen: Soft, nontender, nondistended,no masses, no organomegaly  Pelvic exam: deferred  Extremities: no edema    Labs  CBC Results       23     1208    WBC 7.18    RBC 4.87    HGB 13.2    HCT 40.7    MCV 83.6    MCH 27.1    MCHC 32.4              Assessment/Plan     39yo  presenting for RA-TLH, BSO, cysto for definitive management of menorrhagia/adenomysosis and RR surgery for her BRCA1 status    1. NPO  2. Ancef/Flagyl for pre-op abx ppx  3. ERAS porotocol  4. Anticipate same day discharge to home from PACU    Meghna Strong DO

## 2023-12-27 NOTE — H&P
Gynecology History and Physical    HPI     Patient is a 38 y.o. female who presents for RA-TLH, BSO, cysto for definitive management of menorrhagia likely related to adenomyosis as well as risk reducing surgery for her BRCA1 mutation. She has undergone counseling regarding the impact of surgical menopause and need for post operative HRT as well as the sterilization nature of this procedure. She understands and has no desires for future fertility.    OB History:   OB History    Para Term  AB Living   3 3 3 0 0 3   SAB IAB Ectopic Multiple Live Births   0 0 0 0 3      # Outcome Date GA Lbr Bro/2nd Weight Sex Delivery Anes PTL Lv   3 Term 10/04/15 39w0d  3175 g (7 lb) F Vag-Spont   SOY   2 Term 14 39w0d  3175 g (7 lb) F Vag-Spont   SOY   1 Term 02 41w0d  2722 g (6 lb) F Vag-Spont   SOY       Medical History:   Past Medical History:   Diagnosis Date   • BRCA positive     thinks BRCA 1   • Breast cancer screening by mammogram 2023    BIRADS CATEGORY 2 - BENIGN FINDING (NOR NC D)HETEROGENEOUS   • GERD (gastroesophageal reflux disease)    • Hx of diagnostic mammography 2023    BIRADS CATEGORY 2 - BENIGN FINDING (NOR NC D)HETEROGENEOUS   • Pap smear for cervical cancer screening 2021    neg/hpv neg       Surgical History: No past surgical history on file.    Social History:   Social History     Social History Narrative   • Not on file       Family History:   Family History   Problem Relation Age of Onset   • Heart disease Paternal Grandfather    • Heart disease Maternal Grandfather    • Breast cancer Biological Mother 39        BRCA+   • Thyroid cancer Biological Mother 45   • Skin cancer Biological Mother 40   • Ovarian cancer Mother's Sister    • Colon cancer Neg Hx    • Cervical cancer Neg Hx    • Uterine cancer Neg Hx        Allergies: Patient has no known allergies.    Prior to Admission medications    Medication Sig Start Date End Date Taking? Authorizing Provider    MAGNESIUM ORAL Take by mouth.    Provider, MD Michael   pantoprazole (PROTONIX) 40 mg EC tablet Take 40 mg by mouth daily.    Provider, MD Michael   POTASSIUM ORAL Take by mouth.    Provider, MD Michael       Review of Systems  Pertinent items are noted in HPI.    Objective     Vital Signs for the last 24 hours:   Upon admission    Exam  General Appearance: Alert, cooperative, no acute distress  Head: Normocephalic, without obvious abnormality  Heart: RRR  Lung: CTA BL  Breast: Deferred  Abdomen: Soft, nontender, nondistended,no masses, no organomegaly  Pelvic exam: deferred  Extremities: no edema    Labs  CBC Results       23     1208    WBC 7.18    RBC 4.87    HGB 13.2    HCT 40.7    MCV 83.6    MCH 27.1    MCHC 32.4              Assessment/Plan     37yo  presenting for RA-TLH, BSO, cysto for definitive management of menorrhagia/adenomysosis and RR surgery for her BRCA1 status    1. NPO  2. Ancef/Flagyl for pre-op abx ppx  3. ERAS porotocol  4. Anticipate same day discharge to home from PACU    Meghna Strong DO

## 2023-12-28 ENCOUNTER — ANESTHESIA (OUTPATIENT)
Dept: OPERATING ROOM | Facility: HOSPITAL | Age: 38
Setting detail: HOSPITAL OUTPATIENT SURGERY
End: 2023-12-28
Payer: COMMERCIAL

## 2023-12-28 ENCOUNTER — HOSPITAL ENCOUNTER (OUTPATIENT)
Facility: HOSPITAL | Age: 38
Setting detail: HOSPITAL OUTPATIENT SURGERY
Discharge: HOME | End: 2023-12-28
Attending: OBSTETRICS & GYNECOLOGY | Admitting: OBSTETRICS & GYNECOLOGY
Payer: COMMERCIAL

## 2023-12-28 VITALS
HEART RATE: 66 BPM | DIASTOLIC BLOOD PRESSURE: 76 MMHG | RESPIRATION RATE: 16 BRPM | SYSTOLIC BLOOD PRESSURE: 128 MMHG | TEMPERATURE: 98 F | BODY MASS INDEX: 42.59 KG/M2 | OXYGEN SATURATION: 100 % | WEIGHT: 265 LBS | HEIGHT: 66 IN

## 2023-12-28 DIAGNOSIS — Z15.02 BRCA1 GENE MUTATION POSITIVE IN FEMALE: ICD-10-CM

## 2023-12-28 DIAGNOSIS — Z15.09 BRCA1 GENE MUTATION POSITIVE IN FEMALE: ICD-10-CM

## 2023-12-28 DIAGNOSIS — N92.0 MENORRHAGIA WITH REGULAR CYCLE: ICD-10-CM

## 2023-12-28 DIAGNOSIS — Z15.01 BRCA1 GENE MUTATION POSITIVE IN FEMALE: ICD-10-CM

## 2023-12-28 LAB
ABO + RH BLD: NORMAL
B-HCG UR QL: NEGATIVE
CASE RPRT: NORMAL
CLINICAL INFO: NORMAL
D AG BLD QL: NEGATIVE
LABORATORY COMMENT REPORT: NORMAL
PATH REPORT.FINAL DX SPEC: NORMAL
PATH REPORT.GROSS SPEC: NORMAL
POCT TEST: NORMAL

## 2023-12-28 PROCEDURE — 0UT74ZZ RESECTION OF BILATERAL FALLOPIAN TUBES, PERCUTANEOUS ENDOSCOPIC APPROACH: ICD-10-PCS | Performed by: OBSTETRICS & GYNECOLOGY

## 2023-12-28 PROCEDURE — 71000001 HC PACU PHASE 1 INITIAL 30MIN: Performed by: OBSTETRICS & GYNECOLOGY

## 2023-12-28 PROCEDURE — 58571 TLH W/T/O 250 G OR LESS: CPT | Performed by: OBSTETRICS & GYNECOLOGY

## 2023-12-28 PROCEDURE — 25000000 HC PHARMACY GENERAL: Performed by: OBSTETRICS & GYNECOLOGY

## 2023-12-28 PROCEDURE — 63600000 HC DRUGS/DETAIL CODE: Performed by: ANESTHESIOLOGY

## 2023-12-28 PROCEDURE — 25800000 HC PHARMACY IV SOLUTIONS: Performed by: OBSTETRICS & GYNECOLOGY

## 2023-12-28 PROCEDURE — 71000011 HC PACU PHASE 1 EA ADDL MIN: Performed by: OBSTETRICS & GYNECOLOGY

## 2023-12-28 PROCEDURE — 63700000 HC SELF-ADMINISTRABLE DRUG: Performed by: OBSTETRICS & GYNECOLOGY

## 2023-12-28 PROCEDURE — 36000006 HC OR LEVEL 6 INITIAL 30MIN: Performed by: OBSTETRICS & GYNECOLOGY

## 2023-12-28 PROCEDURE — 63600000 HC DRUGS/DETAIL CODE: Mod: JZ | Performed by: ANESTHESIOLOGY

## 2023-12-28 PROCEDURE — 36000016 HC OR LEVEL 6 EA ADDL MIN: Performed by: OBSTETRICS & GYNECOLOGY

## 2023-12-28 PROCEDURE — S2900 ROBOTIC SURGICAL SYSTEM: HCPCS | Performed by: OBSTETRICS & GYNECOLOGY

## 2023-12-28 PROCEDURE — 63600000 HC DRUGS/DETAIL CODE: Mod: JZ | Performed by: OBSTETRICS & GYNECOLOGY

## 2023-12-28 PROCEDURE — 37000001 HC ANESTHESIA GENERAL: Performed by: OBSTETRICS & GYNECOLOGY

## 2023-12-28 PROCEDURE — 36415 COLL VENOUS BLD VENIPUNCTURE: CPT | Performed by: OBSTETRICS & GYNECOLOGY

## 2023-12-28 PROCEDURE — 200200 PR NO CHARGE: Performed by: OBSTETRICS & GYNECOLOGY

## 2023-12-28 PROCEDURE — 25000000 HC PHARMACY GENERAL: Performed by: ANESTHESIOLOGY

## 2023-12-28 PROCEDURE — 88112 CYTOPATH CELL ENHANCE TECH: CPT | Performed by: OBSTETRICS & GYNECOLOGY

## 2023-12-28 PROCEDURE — 71000002 HC PACU PHASE 2 INITIAL 30MIN: Performed by: OBSTETRICS & GYNECOLOGY

## 2023-12-28 PROCEDURE — 88341 IMHCHEM/IMCYTCHM EA ADD ANTB: CPT | Performed by: OBSTETRICS & GYNECOLOGY

## 2023-12-28 PROCEDURE — 25800000 HC PHARMACY IV SOLUTIONS: Performed by: ANESTHESIOLOGY

## 2023-12-28 PROCEDURE — 88342 IMHCHEM/IMCYTCHM 1ST ANTB: CPT | Mod: 59 | Performed by: OBSTETRICS & GYNECOLOGY

## 2023-12-28 PROCEDURE — 71000012 HC PACU PHASE 2 EA ADDL MIN: Performed by: OBSTETRICS & GYNECOLOGY

## 2023-12-28 PROCEDURE — 0UT24ZZ RESECTION OF BILATERAL OVARIES, PERCUTANEOUS ENDOSCOPIC APPROACH: ICD-10-PCS | Performed by: OBSTETRICS & GYNECOLOGY

## 2023-12-28 PROCEDURE — 0UT94ZZ RESECTION OF UTERUS, PERCUTANEOUS ENDOSCOPIC APPROACH: ICD-10-PCS | Performed by: OBSTETRICS & GYNECOLOGY

## 2023-12-28 PROCEDURE — 27200000 HC STERILE SUPPLY: Performed by: OBSTETRICS & GYNECOLOGY

## 2023-12-28 PROCEDURE — 8E0W4CZ ROBOTIC ASSISTED PROCEDURE OF TRUNK REGION, PERCUTANEOUS ENDOSCOPIC APPROACH: ICD-10-PCS | Performed by: OBSTETRICS & GYNECOLOGY

## 2023-12-28 PROCEDURE — 0TJB8ZZ INSPECTION OF BLADDER, VIA NATURAL OR ARTIFICIAL OPENING ENDOSCOPIC: ICD-10-PCS | Performed by: OBSTETRICS & GYNECOLOGY

## 2023-12-28 RX ORDER — FENTANYL CITRATE 50 UG/ML
50 INJECTION, SOLUTION INTRAMUSCULAR; INTRAVENOUS EVERY 5 MIN PRN
Status: DISCONTINUED | OUTPATIENT
Start: 2023-12-28 | End: 2023-12-28 | Stop reason: HOSPADM

## 2023-12-28 RX ORDER — GABAPENTIN 300 MG/1
600 CAPSULE ORAL
Status: COMPLETED | OUTPATIENT
Start: 2023-12-28 | End: 2023-12-28

## 2023-12-28 RX ORDER — LIDOCAINE HYDROCHLORIDE 10 MG/ML
INJECTION, SOLUTION INFILTRATION; PERINEURAL AS NEEDED
Status: DISCONTINUED | OUTPATIENT
Start: 2023-12-28 | End: 2023-12-28 | Stop reason: SURG

## 2023-12-28 RX ORDER — DEXTROSE 40 %
15-30 GEL (GRAM) ORAL AS NEEDED
Status: DISCONTINUED | OUTPATIENT
Start: 2023-12-28 | End: 2023-12-28 | Stop reason: HOSPADM

## 2023-12-28 RX ORDER — OXYCODONE HYDROCHLORIDE 5 MG/1
5 TABLET ORAL EVERY 4 HOURS PRN
Status: DISCONTINUED | OUTPATIENT
Start: 2023-12-28 | End: 2023-12-28

## 2023-12-28 RX ORDER — HYDROMORPHONE HYDROCHLORIDE 1 MG/ML
0.5 INJECTION, SOLUTION INTRAMUSCULAR; INTRAVENOUS; SUBCUTANEOUS
Status: DISCONTINUED | OUTPATIENT
Start: 2023-12-28 | End: 2023-12-28 | Stop reason: HOSPADM

## 2023-12-28 RX ORDER — KETAMINE HCL IN 0.9 % NACL 2 MG/ML
PLASTIC BAG, INJECTION (ML) INTRAVENOUS CONTINUOUS PRN
Status: DISCONTINUED | OUTPATIENT
Start: 2023-12-28 | End: 2023-12-28 | Stop reason: SURG

## 2023-12-28 RX ORDER — METRONIDAZOLE 500 MG/100ML
500 INJECTION, SOLUTION INTRAVENOUS
Status: COMPLETED | OUTPATIENT
Start: 2023-12-28 | End: 2023-12-28

## 2023-12-28 RX ORDER — ESTRADIOL 0.1 MG/D
1 PATCH TRANSDERMAL WEEKLY
Qty: 4 PATCH | Refills: 11 | Status: SHIPPED | OUTPATIENT
Start: 2023-12-28 | End: 2024-11-28

## 2023-12-28 RX ORDER — ACETAMINOPHEN 325 MG/1
975 TABLET ORAL EVERY 6 HOURS PRN
Status: DISCONTINUED | OUTPATIENT
Start: 2023-12-28 | End: 2023-12-28 | Stop reason: HOSPADM

## 2023-12-28 RX ORDER — ACETAMINOPHEN 325 MG/1
975 TABLET ORAL
Status: COMPLETED | OUTPATIENT
Start: 2023-12-28 | End: 2023-12-28

## 2023-12-28 RX ORDER — IBUPROFEN 600 MG/1
600 TABLET ORAL 4 TIMES DAILY PRN
Qty: 90 TABLET | Refills: 0 | Status: SHIPPED | OUTPATIENT
Start: 2023-12-28 | End: 2024-02-20 | Stop reason: ALTCHOICE

## 2023-12-28 RX ORDER — ROCURONIUM BROMIDE 10 MG/ML
INJECTION, SOLUTION INTRAVENOUS AS NEEDED
Status: DISCONTINUED | OUTPATIENT
Start: 2023-12-28 | End: 2023-12-28 | Stop reason: SURG

## 2023-12-28 RX ORDER — IBUPROFEN 200 MG
16-32 TABLET ORAL AS NEEDED
Status: DISCONTINUED | OUTPATIENT
Start: 2023-12-28 | End: 2023-12-28 | Stop reason: HOSPADM

## 2023-12-28 RX ORDER — TRAMADOL HYDROCHLORIDE 50 MG/1
100 TABLET ORAL ONCE
Status: COMPLETED | OUTPATIENT
Start: 2023-12-28 | End: 2023-12-28

## 2023-12-28 RX ORDER — FENTANYL CITRATE 50 UG/ML
INJECTION, SOLUTION INTRAMUSCULAR; INTRAVENOUS AS NEEDED
Status: DISCONTINUED | OUTPATIENT
Start: 2023-12-28 | End: 2023-12-28 | Stop reason: SURG

## 2023-12-28 RX ORDER — PROPOFOL 10 MG/ML
INJECTION, EMULSION INTRAVENOUS AS NEEDED
Status: DISCONTINUED | OUTPATIENT
Start: 2023-12-28 | End: 2023-12-28 | Stop reason: SURG

## 2023-12-28 RX ORDER — DEXAMETHASONE SODIUM PHOSPHATE 4 MG/ML
INJECTION, SOLUTION INTRA-ARTICULAR; INTRALESIONAL; INTRAMUSCULAR; INTRAVENOUS; SOFT TISSUE AS NEEDED
Status: DISCONTINUED | OUTPATIENT
Start: 2023-12-28 | End: 2023-12-28 | Stop reason: SURG

## 2023-12-28 RX ORDER — ACETAMINOPHEN 325 MG/1
975 TABLET ORAL EVERY 6 HOURS PRN
Qty: 90 TABLET | Refills: 0 | Status: SHIPPED | OUTPATIENT
Start: 2023-12-28 | End: 2024-01-27

## 2023-12-28 RX ORDER — MIDAZOLAM HYDROCHLORIDE 2 MG/2ML
INJECTION, SOLUTION INTRAMUSCULAR; INTRAVENOUS AS NEEDED
Status: DISCONTINUED | OUTPATIENT
Start: 2023-12-28 | End: 2023-12-28 | Stop reason: SURG

## 2023-12-28 RX ORDER — HYDROMORPHONE HYDROCHLORIDE 1 MG/ML
INJECTION, SOLUTION INTRAMUSCULAR; INTRAVENOUS; SUBCUTANEOUS AS NEEDED
Status: DISCONTINUED | OUTPATIENT
Start: 2023-12-28 | End: 2023-12-28 | Stop reason: SURG

## 2023-12-28 RX ORDER — CEFAZOLIN SODIUM 2 G/100ML
2 INJECTION, SOLUTION INTRAVENOUS
Status: COMPLETED | OUTPATIENT
Start: 2023-12-28 | End: 2023-12-28

## 2023-12-28 RX ORDER — ONDANSETRON 4 MG/1
4 TABLET, ORALLY DISINTEGRATING ORAL EVERY 8 HOURS PRN
Status: DISCONTINUED | OUTPATIENT
Start: 2023-12-28 | End: 2023-12-28 | Stop reason: HOSPADM

## 2023-12-28 RX ORDER — ONDANSETRON HYDROCHLORIDE 2 MG/ML
4 INJECTION, SOLUTION INTRAVENOUS
Status: DISCONTINUED | OUTPATIENT
Start: 2023-12-28 | End: 2023-12-28 | Stop reason: HOSPADM

## 2023-12-28 RX ORDER — LIDOCAINE HYDROCHLORIDE AND EPINEPHRINE 10; 10 UG/ML; MG/ML
INJECTION, SOLUTION INFILTRATION; PERINEURAL
Status: DISCONTINUED | OUTPATIENT
Start: 2023-12-28 | End: 2023-12-28 | Stop reason: HOSPADM

## 2023-12-28 RX ORDER — SODIUM CHLORIDE, SODIUM GLUCONATE, SODIUM ACETATE, POTASSIUM CHLORIDE AND MAGNESIUM CHLORIDE 30; 37; 368; 526; 502 MG/100ML; MG/100ML; MG/100ML; MG/100ML; MG/100ML
INJECTION, SOLUTION INTRAVENOUS CONTINUOUS PRN
Status: DISCONTINUED | OUTPATIENT
Start: 2023-12-28 | End: 2023-12-28 | Stop reason: SURG

## 2023-12-28 RX ORDER — DEXTROSE 50 % IN WATER (D50W) INTRAVENOUS SYRINGE
25 AS NEEDED
Status: DISCONTINUED | OUTPATIENT
Start: 2023-12-28 | End: 2023-12-28 | Stop reason: HOSPADM

## 2023-12-28 RX ORDER — SODIUM CHLORIDE, SODIUM GLUCONATE, SODIUM ACETATE, POTASSIUM CHLORIDE AND MAGNESIUM CHLORIDE 30; 37; 368; 526; 502 MG/100ML; MG/100ML; MG/100ML; MG/100ML; MG/100ML
80 INJECTION, SOLUTION INTRAVENOUS CONTINUOUS
Status: DISCONTINUED | OUTPATIENT
Start: 2023-12-28 | End: 2023-12-28 | Stop reason: HOSPADM

## 2023-12-28 RX ORDER — KETAMINE HYDROCHLORIDE 10 MG/ML
INJECTION, SOLUTION INTRAMUSCULAR; INTRAVENOUS AS NEEDED
Status: DISCONTINUED | OUTPATIENT
Start: 2023-12-28 | End: 2023-12-28 | Stop reason: SURG

## 2023-12-28 RX ORDER — TRAMADOL HYDROCHLORIDE 50 MG/1
50 TABLET ORAL
Status: COMPLETED | OUTPATIENT
Start: 2023-12-28 | End: 2023-12-28

## 2023-12-28 RX ORDER — TRAMADOL HYDROCHLORIDE 50 MG/1
50 TABLET ORAL EVERY 6 HOURS PRN
Qty: 10 TABLET | Refills: 0 | Status: SHIPPED | OUTPATIENT
Start: 2023-12-28 | End: 2024-01-02

## 2023-12-28 RX ORDER — CELECOXIB 200 MG/1
200 CAPSULE ORAL
Status: COMPLETED | OUTPATIENT
Start: 2023-12-28 | End: 2023-12-28

## 2023-12-28 RX ORDER — KETOROLAC TROMETHAMINE 30 MG/ML
30 INJECTION, SOLUTION INTRAMUSCULAR; INTRAVENOUS ONCE
Status: COMPLETED | OUTPATIENT
Start: 2023-12-28 | End: 2023-12-28

## 2023-12-28 RX ORDER — ONDANSETRON HYDROCHLORIDE 2 MG/ML
INJECTION, SOLUTION INTRAVENOUS AS NEEDED
Status: DISCONTINUED | OUTPATIENT
Start: 2023-12-28 | End: 2023-12-28 | Stop reason: SURG

## 2023-12-28 RX ADMIN — LIDOCAINE HYDROCHLORIDE 10 ML: 10 INJECTION, SOLUTION INFILTRATION; PERINEURAL at 07:27

## 2023-12-28 RX ADMIN — CEFAZOLIN SODIUM 2 G: 2 INJECTION, SOLUTION INTRAVENOUS at 07:35

## 2023-12-28 RX ADMIN — METRONIDAZOLE 500 MG: 500 INJECTION, SOLUTION INTRAVENOUS at 07:38

## 2023-12-28 RX ADMIN — CELECOXIB 200 MG: 200 CAPSULE ORAL at 06:59

## 2023-12-28 RX ADMIN — TRAMADOL HYDROCHLORIDE 100 MG: 50 TABLET, COATED ORAL at 12:58

## 2023-12-28 RX ADMIN — ROCURONIUM BROMIDE 100 MG: 10 INJECTION, SOLUTION INTRAVENOUS at 07:28

## 2023-12-28 RX ADMIN — KETOROLAC TROMETHAMINE 30 MG: 30 INJECTION, SOLUTION INTRAMUSCULAR at 13:01

## 2023-12-28 RX ADMIN — ROCURONIUM BROMIDE 5 MG: 10 INJECTION, SOLUTION INTRAVENOUS at 09:51

## 2023-12-28 RX ADMIN — TRAMADOL HYDROCHLORIDE 50 MG: 50 TABLET, COATED ORAL at 07:00

## 2023-12-28 RX ADMIN — DEXMEDETOMIDINE HYDROCHLORIDE 0.5 MCG/KG/HR: 100 INJECTION, SOLUTION INTRAVENOUS at 08:00

## 2023-12-28 RX ADMIN — PROPOFOL 200 MG: 10 INJECTION, EMULSION INTRAVENOUS at 07:27

## 2023-12-28 RX ADMIN — Medication 0.1 MG/KG/HR: at 08:25

## 2023-12-28 RX ADMIN — ACETAMINOPHEN 975 MG: 325 TABLET ORAL at 07:00

## 2023-12-28 RX ADMIN — SODIUM CHLORIDE, SODIUM GLUCONATE, SODIUM ACETATE, POTASSIUM CHLORIDE AND MAGNESIUM CHLORIDE: 526; 502; 368; 37; 30 INJECTION, SOLUTION INTRAVENOUS at 07:23

## 2023-12-28 RX ADMIN — PROPOFOL 30 MG: 10 INJECTION, EMULSION INTRAVENOUS at 10:28

## 2023-12-28 RX ADMIN — FENTANYL CITRATE 100 MCG: 50 INJECTION, SOLUTION INTRAMUSCULAR; INTRAVENOUS at 07:27

## 2023-12-28 RX ADMIN — MIDAZOLAM HYDROCHLORIDE 2 MG: 1 INJECTION, SOLUTION INTRAMUSCULAR; INTRAVENOUS at 07:23

## 2023-12-28 RX ADMIN — Medication 50 MG: at 07:33

## 2023-12-28 RX ADMIN — SUGAMMADEX 400 MG: 100 INJECTION, SOLUTION INTRAVENOUS at 10:40

## 2023-12-28 RX ADMIN — DEXAMETHASONE SODIUM PHOSPHATE 4 MG: 4 INJECTION, SOLUTION INTRAMUSCULAR; INTRAVENOUS at 07:47

## 2023-12-28 RX ADMIN — PROPOFOL 20 MG: 10 INJECTION, EMULSION INTRAVENOUS at 10:22

## 2023-12-28 RX ADMIN — GABAPENTIN 600 MG: 300 CAPSULE ORAL at 06:59

## 2023-12-28 RX ADMIN — ONDANSETRON HYDROCHLORIDE 4 MG: 2 SOLUTION INTRAMUSCULAR; INTRAVENOUS at 10:16

## 2023-12-28 RX ADMIN — ROCURONIUM BROMIDE 20 MG: 10 INJECTION, SOLUTION INTRAVENOUS at 09:00

## 2023-12-28 RX ADMIN — PROPOFOL 50 MG: 10 INJECTION, EMULSION INTRAVENOUS at 10:32

## 2023-12-28 RX ADMIN — PROPOFOL 20 MG: 10 INJECTION, EMULSION INTRAVENOUS at 10:36

## 2023-12-28 RX ADMIN — SODIUM CHLORIDE, SODIUM GLUCONATE, SODIUM ACETATE, POTASSIUM CHLORIDE AND MAGNESIUM CHLORIDE: 526; 502; 368; 37; 30 INJECTION, SOLUTION INTRAVENOUS at 07:37

## 2023-12-28 RX ADMIN — FENTANYL CITRATE 50 MCG: 0.05 INJECTION, SOLUTION INTRAMUSCULAR; INTRAVENOUS at 11:19

## 2023-12-28 RX ADMIN — SODIUM CHLORIDE, SODIUM GLUCONATE, SODIUM ACETATE, POTASSIUM CHLORIDE AND MAGNESIUM CHLORIDE 80 ML/HR: 526; 502; 368; 37; 30 INJECTION, SOLUTION INTRAVENOUS at 07:12

## 2023-12-28 RX ADMIN — HYDROMORPHONE HYDROCHLORIDE 0.5 MG: 1 INJECTION, SOLUTION INTRAMUSCULAR; INTRAVENOUS; SUBCUTANEOUS at 10:42

## 2023-12-28 NOTE — ANESTHESIA POSTPROCEDURE EVALUATION
Patient: Anthony Britton Long    Procedure Summary     Date: 12/28/23 Room / [facility]: [facility] OR [facility] / [facility] OR    Anesthesia Start: 0723 Anesthesia Stop: 1100    Procedure: Robotic assisted total laparoscopic hysterectomy, bilateral salpingoophorectomy, cystoscopy, pelvic washings (Bilateral: Abdomen) Diagnosis:       BRCA1 gene mutation positive in female      Menorrhagia with regular cycle      (BRCA1 gene mutation positive in female [Z15.01, Z15.02, Z15.09])      (Menorrhagia with regular cycle [N92.0])    Surgeons: Meghna Strong DO Responsible Provider: Meghna Lee MD    Anesthesia Type: general ASA Status: 2          Anesthesia Type: general  PACU Vitals  12/28/2023 1052 - 12/28/2023 1152      12/28/2023  1058 12/28/2023  1100 12/28/2023  1115 12/28/2023  1119    BP: -- -- 133/71 123/77    Temp: 36.7 °C (98.1 °F) -- -- --    Pulse: -- 66 62 66    Resp: -- 16 18 16    SpO2: -- 99 % 100 % 100 %              12/28/2023  1130 12/28/2023  1145          BP: 123/77 121/75      Temp: -- --      Pulse: 58 68      Resp: 15 20      SpO2: 98 % 100 %              Anesthesia Post Evaluation    Pain management: adequate  Patient participation: complete - patient participated  Level of consciousness: awake and alert  Cardiovascular status: acceptable  Airway Patency: adequate  Respiratory status: acceptable  Hydration status: acceptable  Anesthetic complications: no

## 2023-12-28 NOTE — ANESTHESIA PROCEDURE NOTES
Airway  Urgency: elective    Start Time: 12/28/2023 7:31 AM  Airway not difficult    General Information and Staff    Patient location during procedure: OR  Anesthesiologist: Meghna Lee MD  Performed: anesthesiologist   Performed by: Meghna Lee MD  Authorized by: Meghna Lee MD      Indications and Patient Condition  Indications for airway management: anesthesia  Sedation level: deep  Preoxygenated: yes  Patient position: sniffing  Mask difficulty assessment: 1 - vent by mask    Final Airway Details  Final airway type: endotracheal airway      Successful airway: ETT  Cuffed: yes   Successful intubation technique: video laryngoscopy  Facilitating devices/methods: intubating stylet  Endotracheal tube insertion site: oral  Blade: Amy  Blade size: #4  ETT size (mm): 7.5  Cormack-Lehane Classification: grade I - full view of glottis  Placement verified by: chest auscultation and capnometry   Measured from: lips  ETT to lips (cm): 21  Number of attempts at approach: 1

## 2023-12-28 NOTE — ANESTHESIOLOGIST PRE-PROCEDURE ATTESTATION
Pre-Procedure Patient Identification:  I am the Primary Anesthesiologist and have identified the patient on 12/28/23 at 6:54 AM.   I have confirmed the procedure(s) will be performed by the following surgeon/proceduralist Meghna Strong DO.

## 2023-12-28 NOTE — PROGRESS NOTES
Anthony seen in PACU, feeling well, pain controlled, tolerating PO intake. VSS. Has been ambulatory. Reviewed surgicery and again reviewed discharge instructions. She will be discharged to home with tylenol/motrin and PRN tramadol. We also reviewed HRT recommendations. She refers to use a patch as opposed to PO as this will require less frequent dosing. Will see her back in the office in 6 weeks. She will call sooner with any questions/conerns.

## 2023-12-28 NOTE — OR SURGEON
Pre-Procedure patient identification:  I am the primary operating surgeon/proceduralist and I have reviewed the applicable pathology reports and radiology studies for this procedure. I have identified the patient on 12/28/23 at 7:19 AM Meghna Strong DO  Phone Number: [phone]

## 2023-12-28 NOTE — OP NOTE
Robotic assisted total laparoscopic hysterectomy, bilateral salpingoophorectomy, cystoscopy, pelvic washings (B) Procedure Note     Procedure(s):  Robotic assisted total laparoscopic hysterectomy, bilateral salpingoophorectomy, cystoscopy, pelvic washings  CPT(R) Code:  31416 - WA LAPAROSCOPY W TOT HYSTERECTUTERUS <=250 GRAM  W TUBE/OVARY    Pre-op Diagnosis: BRCA1 gene mutation positive, menorrhagia, adenomyosis.     Post-op Diagnosis: Same.    Surgeon: Meghna Strong DO     Assistants: Branden Mendoza MD, PGY4; Helen Calvo DO, PGY1    Anesthesia: General endotracheal anesthesia.     Estimated Blood Loss: 20 mL    Pre-incision Antibiotics: Intravenous cefazolin and metronidazole.           Specimens:   ID Type Source Tests Collected by Time Destination   1 : pelvic washings Wash Pelvic Washings CYTOLOGY, NON-GYN ([facility]) Meghna Strong,  12/28/2023 0824    2 : uterus, cervix, bilateral tubes and ovaries; positive for BRCA1 Tissue Uterus/Tubes/Ovaries PATHOLOGY TISSUE EXAM Meghna Strong DO 12/28/2023 0938      Complications: None.           Disposition/Condition: PACU - hemodynamically stable.    Findings:  Uterus: mobile, 10-week-size, regular contour  Adnexa: normal tubes and ovaries bilaterally  Abdomen: no significant adhesive disease  Upper Abdomen: no gross abnormalities  Cystoscopy: bladder survey revealed no evidence of bladder injury or foreign materials, with brisk jetting of urine seen from bilateral ureteral orifices    Procedure Details:   Informed consent was obtained.  The patient was taken to the operating room where general anesthesia was achieved.  An endotracheal tube was placed.  She was then positioned in the dorsal lithotomy position with legs in the Naveen stirrups.  Special attention was made to ensure pressure points were protected, tubing was protected, and arms were tucked bilaterally.  She was prepped and draped in normal sterile fashion.  Universal protocol was followed  and a timeout was performed.  A Jarrett catheter was placed.  The cervix was visualized, and the anterior lip was secured for traction.  The uterus was sounded to 9 cm, and the cervix dilated slightly to accommodate passage of the V-Care uterine manipulator.      The supra-umbilical area skin was incised with a scalpel.  The abdomen was tented up and entry achieved via the Visiport method following initial unsuccessful entry with the Veress needle technique at the same site.  The abdomen was insufflated with CO2 to a peak pressure of 15 mmHg.  After completion of insufflation, inspection revealed no evidence of entry injury.  The upper abdomen appeared grossly normal.  Three additional robotic trocars and a 5 mm assistant trocar were placed under direct visualization.  The patient was placed into Trendelenburg position.  The bowel was gently retracted into the abdominal cavity.  The above findings were noted in the pelvis.  Pelvic washings were collected and sent to pathology.  The robot was then docked.      The hysterectomy was performed in a similar fashion on both sides. The course of each ureter was identified transperitoneally.  The infundibulopelvic ligament on each side was secured with the bipolar instrument at least 2 cm lateral to the ovary, then transected with the monopolar scissors.  The round ligament on each side was secured with the bipolar instrument then transected with the monopolar scissors.  The vesico-uterine peritoneum was dissected towards the midline and the bladder flap was created over the cervix.  The uterine vessels on each side were skeletonized, secured with the bipolar instrument, then transected with the monopolar scissors.  The cardinal ligaments were taken down in similar fashion.  With the blood supply secured, colpotomy was initiated anteriorly and carried around circumferentially over the manipulator cup. The specimen was removed from the vagina intact, and marked for  BRCA-specialized permanent pathologic evaluation.  The vaginal cuff was closed laparoscopically with 2-0 V-Loc barbed suture in the usual running unlocked fashion, working from both angle towards the midline.  The pelvis was serially irrigated with sterile saline.  Laparoscopic survey was again performed, and all pedicles were found to be hemostatic.  The robot was undocked.     The Jarrett was removed and the bladder was back-filled with sterile water.  Bladder survey revealed no evidence of bladder injury or foreign materials.  Brisk jetting of urine was seen from bilateral ureteral orifices.  The bladder was drained.  The Jarrett was not replaced.  The abdomen was desufflated and the trocars were removed.  Skin incisions were closed with 4-0 Biosyn.  Surgical skin glue was applied.  The skin incisions were infiltrated with local anesthetic, using a total of 20 cc 1% lidocaine with epinephrine.       The patient was returned to supine position and awakened.  All counts were correct at the conclusion of the case.  The patient was awakened, extubated, and taken to the PACU in stable condition.    Dr. Strong was present and scrubbed and participated in the entire procedure.    Branden Mendoza MD, PGY4  Obstetrics & Gynecology

## 2023-12-28 NOTE — DISCHARGE INSTRUCTIONS
Robotic Total Laparoscopic Hysterectomy   with Bilateral Salpingo-Oophorectomy  (Removal of Uterus, Cervix,   both Fallopian Tubes, and both Ovaries)  Discharge Instructions      What to Expect    Although your surgery was done laparoscopically, it is still surgery and may take some time to recover. Please resume activities slowly.     It is normal for one incision to be larger and sometimes more painful than the rest.     Patients are usually not prescribed narcotic pain medication at home after laparoscopic surgery.  To minimize pain, for the first 3 days after your discharge, continue to take acetaminophen (Tylenol) and ibuprofen (Motrin or Advil) every 6 hours as directed on your discharge medication list.  After the 3 days are up, you can continue to take these medications every 6 hours as needed.  You may also put an ice pack over the incision if you would like.      Try to drink plenty of fluids.  We recommend taking one capful of Miralax daily and a stool softener (Colace/Docusate) if you are prone to constipation.      Activity    You may resume driving when you are pain-free enough to react quickly with your braking foot.  For most patients this is the case within the first week (or 2) of surgery.      Do not lift more than 25 lbs for 2 weeks from the time of surgery.    Climbing stairs is perfectly ok. If you feel unsteady, hold onto a wall or railing for balance.  Gradually increase your physical activity as tolerated.  If you're feeling well at 2 weeks, you may resume light exercises such as jogging or elliptical machines.  Avoid exercises that require a lot of core work, such as pilates, abdominal crunches, cross-fit, for 4 weeks.    You may shower normally.  The incisions usually have dissolvable sutures and/or glue at the incision sites.  Please keep clean and dry.  Just pat the area dry after a shower.  Skin glue should be peeled off in one week, remove it just as you would a band-aid.  A small  amount of redness at the skin edge is normal and a small amount of bloody or clear discharge can be expected.    If you have had a hysterectomy performed no tampons, douching, or sexual intercourse until you’re examined and given the approval from the doctor (usually around 6-8 weeks from surgery).    PLEASE CALL THE OFFICE -[phone] IF YOU EXPERIENCE ANY OF THE FOLLOWING:  - Heavy vaginal bleeding (soaking through one maxi-pad an hour)  - Fever greater than 101 F.   - Worsening pain or pain not relieved by pain medications.  - Constipation not relieved by laxatives.  - Persistent nausea or vomiting or any other concerns.    PLEASE MAKE A POST-OP APPOINTMENT FOR 2 WEEKS AFTER SURGERY

## 2023-12-28 NOTE — PERIOPERATIVE NURSING NOTE
Criteria met for discharge to home.  AAOx4.  Respirations easy, denies SOB/SALDIVAR.  Skin WDI, abdominal lap sites DEXTER with skin glue.  Tolerated post op snack without N/V.  Ambulated to BR, gait steady.  +Void.  Written discharge instructions given to and reviewed with patient.  Questions asked and answered.  Dr. Strong saw patient in stage 2.

## 2024-01-03 ENCOUNTER — TELEPHONE (OUTPATIENT)
Dept: OBSTETRICS AND GYNECOLOGY | Facility: CLINIC | Age: 39
End: 2024-01-03
Payer: COMMERCIAL

## 2024-01-03 NOTE — TELEPHONE ENCOUNTER
Spoke with Anthony today, doing well with her recovery. Minimal pain and no concerns. We reviewed her pathology which was unremarkable other than a 2 mm foci of STIC in the left fallopian tube. I did confirm with Dr. Euceda (GYN oncology) that there is no additional surgery (ie. LN dissection) indicated or treatment needed with this pathology finding. He advised annual Gyn exam for follow up will be sufficient. Anthony was of course relieved to hear this. Encouraged her to call with any questions and if she'd like to meet with GYN oncology to review this as well she is welcome to.

## 2024-01-04 ENCOUNTER — TELEPHONE (OUTPATIENT)
Dept: OBSTETRICS AND GYNECOLOGY | Facility: CLINIC | Age: 39
End: 2024-01-04
Payer: COMMERCIAL

## 2024-01-04 NOTE — TELEPHONE ENCOUNTER
Patient called with some questions regarding starting the estrogen patch. She knows this is to help combat potential hot flashes and night sweats related to surgical menopause, but is worried about the potential cancer risks. She is planning to have a prophylactic mastectomy in the near future, but until that time is worried about increasing her risk until then. She states that if this is just to help with hot flashes and night sweats, she would prefer to at least hold off on taking it until she has her surgery. I told her there are other risks of surgical menopause such as cardiovascular risks and osteoporosis, but that I could not give her all the risks vs. Benefits. I told her I would pass this message along to ELS to call her to discuss in more detail. Pt is comfortable with this. She is aware ELS is covering the floor and in the OR today, so I was not sure when she would be available for a call. Pt is ok with this and plans to start the patch today anyway.

## 2024-01-05 ENCOUNTER — TELEPHONE (OUTPATIENT)
Dept: OBSTETRICS AND GYNECOLOGY | Facility: CLINIC | Age: 39
End: 2024-01-05
Payer: COMMERCIAL

## 2024-01-05 NOTE — TELEPHONE ENCOUNTER
Spoke with horace regarding HRT recommendation. She is really worried about using it with known BRCA mutation and the possibility of increasing her risk of breast cancer. Preoperatively we had discussed this concern and that alternatively if she does not use it there is an increased risk of heart disease and osteoporosis. We again reviewed this dichotomy. She ultimately thinks she will do fine with sx of HF/NS but just wants to mitigate cancer risk vs heart disease risk. She is planning to undergo a prophylactic mastectomy as soon as she is healed from her hysterectomy. I suggested that it would not be unreasonable to wait to start HRT until after the mastectomy if that is more reassuring to her. She plans to do this. I also suggested that she see Dr. Armando at [facility] as she hasn't been to her breast specialist in a while and it may help streamline things if she gets all her breast care and imaging through [facility]. She does plan to do this.

## 2024-01-17 ENCOUNTER — TELEPHONE (OUTPATIENT)
Dept: OBSTETRICS AND GYNECOLOGY | Facility: CLINIC | Age: 39
End: 2024-01-17
Payer: COMMERCIAL

## 2024-01-24 RX ORDER — IBUPROFEN 600 MG/1
600 TABLET ORAL 4 TIMES DAILY PRN
Qty: 90 TABLET | Refills: 0 | OUTPATIENT
Start: 2024-01-24

## 2024-01-30 ENCOUNTER — OFFICE VISIT (OUTPATIENT)
Dept: SURGERY | Facility: CLINIC | Age: 39
End: 2024-01-30
Payer: COMMERCIAL

## 2024-01-30 VITALS
OXYGEN SATURATION: 98 % | WEIGHT: 265 LBS | BODY MASS INDEX: 42.59 KG/M2 | SYSTOLIC BLOOD PRESSURE: 123 MMHG | TEMPERATURE: 98.1 F | RESPIRATION RATE: 18 BRPM | HEART RATE: 83 BPM | DIASTOLIC BLOOD PRESSURE: 73 MMHG | HEIGHT: 66 IN

## 2024-01-30 DIAGNOSIS — Z15.01 BRCA1 GENE MUTATION POSITIVE IN FEMALE: Primary | ICD-10-CM

## 2024-01-30 DIAGNOSIS — Z15.02 BRCA1 GENE MUTATION POSITIVE IN FEMALE: Primary | ICD-10-CM

## 2024-01-30 DIAGNOSIS — Z15.09 BRCA1 GENE MUTATION POSITIVE IN FEMALE: Primary | ICD-10-CM

## 2024-01-30 PROCEDURE — 99245 OFF/OP CONSLTJ NEW/EST HI 55: CPT | Performed by: SURGERY

## 2024-01-30 PROCEDURE — 3008F BODY MASS INDEX DOCD: CPT | Performed by: SURGERY

## 2024-01-30 RX ORDER — OMEPRAZOLE 40 MG/1
CAPSULE, DELAYED RELEASE ORAL DAILY
COMMUNITY
Start: 2023-11-20

## 2024-01-30 ASSESSMENT — ENCOUNTER SYMPTOMS
COUGH: 0
SHORTNESS OF BREATH: 0
CONSTIPATION: 0
SORE THROAT: 0
NUMBNESS: 0
APPETITE CHANGE: 0
FEVER: 0
WEAKNESS: 0
NERVOUS/ANXIOUS: 0
FACIAL SWELLING: 0
HEADACHES: 0
DIARRHEA: 0
JOINT SWELLING: 0
SLEEP DISTURBANCE: 0
RECTAL PAIN: 0
BACK PAIN: 0
FATIGUE: 0
NAUSEA: 0
CHEST TIGHTNESS: 0
DIFFICULTY URINATING: 0
EYE DISCHARGE: 0
DYSPHORIC MOOD: 0
ABDOMINAL PAIN: 0
UNEXPECTED WEIGHT CHANGE: 0
BRUISES/BLEEDS EASILY: 0
DIZZINESS: 0
BLOOD IN STOOL: 0
CHILLS: 0

## 2024-01-30 ASSESSMENT — PAIN SCALES - GENERAL: PAINLEVEL: 2

## 2024-01-30 NOTE — LETTER
2024     Nikunj Lehman,   [address] [facility] [address]    Patient: Anthony Tate  YOB: 1985  Date of Visit: 2024      Dear Dr. Lehman:    Thank you for referring Anthony Tate to me for evaluation. Below are my notes for this consultation.    If you have questions, please do not hesitate to call me. I look forward to following your patient along with you.         Sincerely,        Brenton Armando,         CC: MD Meghna Bonilla DO Ciocca, Robin M, DO  2024  6:54 PM  Signed   Anthony Tate is a 38 y.o. female who presents today for evaluation.     She is here for consultation at the request of Dr. Lehman.  She has a known BRCA1 mutation and recently had a hysterectomy and salpingo-oophorectomy.  She was recommended to see me to discuss breast cancer surveillance.  She denies any breast masses, nipple discharge or breast pain.  She had a mammogram in 2023 which showed no worrisome findings in either breast.  She has had MRIs in the past but not for several years.  She is scheduled for an MRI next week.  At this point she is ready to move forward with prophylactic surgery.    Past Medical History:    Past Medical History:   Diagnosis Date   • BRCA positive     thinks BRCA 1   • Breast cancer screening by mammogram 2023    BIRADS CATEGORY 2 - BENIGN FINDING (NOR NC D)HETEROGENEOUS   • GERD (gastroesophageal reflux disease)    • Hx of diagnostic mammography 2023    BIRADS CATEGORY 2 - BENIGN FINDING (NOR NC D)HETEROGENEOUS   • Pap smear for cervical cancer screening 2021    neg/hpv neg       OB History:   OB History    Para Term  AB Living   3 3 3     3   SAB IAB Ectopic Multiple Live Births           3      # Outcome Date GA Lbr Bro/2nd Weight Sex Delivery Anes PTL Lv   3 Term 10/04/15 39w0d  3.175 kg (7 lb) F Vag-Spont   SOY   2 Term 14 39w0d  3.175 kg (7 lb) F  Vag-Spont   SOY   1 Term 03/25/02 41w0d  2.722 kg (6 lb) F Vag-Spont   SOY       Surgical History:   Past Surgical History:   Procedure Laterality Date   • HYSTERECTOMY         Social History:   Social History     Social History Narrative   • Not on file       Family History:   Family History   Problem Relation Age of Onset   • Breast cancer Biological Mother 39        BRCA+   • Thyroid cancer Biological Mother 45   • Skin cancer Biological Mother 40   • COPD Biological Father    • No Known Problems Biological Sister    • No Known Problems Biological Brother    • Heart disease Maternal Grandfather    • Heart disease Paternal Grandfather    • Colon cancer Neg Hx    • Cervical cancer Neg Hx    • Uterine cancer Neg Hx        Allergies: Patient has no known allergies.    Home Medications:  •  ibuprofen  •  omeprazole  •  estradioL      Review of Studies.  As above I personally reviewed her films and agree with the recommendation.     Review of Systems   Constitutional: Negative for appetite change, chills, fatigue, fever and unexpected weight change.   HENT: Negative for congestion, facial swelling, hearing loss, mouth sores, sneezing and sore throat.    Eyes: Negative for discharge.   Respiratory: Negative for cough, chest tightness and shortness of breath.    Cardiovascular: Negative for chest pain.   Gastrointestinal: Negative for abdominal pain, blood in stool, constipation, diarrhea, nausea and rectal pain.   Genitourinary: Negative for difficulty urinating, dyspareunia and vaginal bleeding.   Musculoskeletal: Negative for back pain and joint swelling.   Allergic/Immunologic: Negative for environmental allergies and food allergies.   Neurological: Negative for dizziness, weakness, numbness and headaches.   Hematological: Does not bruise/bleed easily.   Psychiatric/Behavioral: Negative for behavioral problems, dysphoric mood and sleep disturbance. The patient is not nervous/anxious.    All other systems reviewed and  are negative.        She is a well-developed, well-nourished woman in no apparent distress. On HEENT exam there is no cervical adenopathy. There is no scleral icterus. She is alert and oriented times three. She has appropriate mood and affect. Neck is normal with full range of motion. There are no thyroid masses.  She has symmetric breasts. Her breast exam shows no dominant masses, nipple discharge, skin retractions, supraclavicular or axillary adenopathy bilaterally.. Abdomen is soft and nontender without organomegaly. Bilateral lower extremities are without clubbing, cyanosis or edema. On musculoskeletal exam, she moves all extremities without any obvious abnormality; there is no scoliosis.     Impression:   Problem List Items Addressed This Visit        Other    BRCA1 gene mutation positive in female - Primary       Plan: I explained to Anthony it is reasonable to proceed with bilateral prophylactic mastectomy given her personal history of a BRCA1 mutation.  An alternative would be to continue with increased surveillance including mammograms and MRIs spaced to every 6 months apart.  She understands this but would like to move forward.    I explained the procedure for a bilateral total mastectomy.  If her MRI and mammogram are reassuring she will not require lymph node sampling.  Given her significant ptosis and body habitus I do not feel she is a good candidate for nipple sparing technique but she will discuss this with plastic surgery.  We discussed options for reconstruction including free flap reconstruction versus implant-based techniques.  We will arrange for her to meet with Dr. Patton to discuss this further.  She would like to plan surgery sometime in April.  I asked her to give us a call after she has met with Dr. Patton and we will work on getting her surgical date.  We can also discuss any additional questions she has at that point.    All the questions that Leidymichael had were answered during the  course of a 60-minute consultation which included review of her genetic testing results and relevant clinical information.  I urged her to call me if she any additional questions or concerns otherwise we would plan to proceed with her surgery sometime in April..

## 2024-01-30 NOTE — Clinical Note
She is planning bilateral prophylactic mastectomies hopefully sometime in April.  She needs to see Dr. Patton sometime in the next few weeks.  Also see what is available in April and let me know so I can go ahead and put it in the Depo.  She is getting an MRI next week which she is already scheduled but she also needs a mammogram.  I will put a prescription in the chart and hopefully she can get this done at the same time.  I told her you will give her a call later this week to assist her in getting the appointment with Dr. Patton.  Thank you

## 2024-01-30 NOTE — LETTER
2024     Nikunj Lehman DO  [address] [facility] [address]    Patient: Anthony Tate  YOB: 1985  Date of Visit: 2024      Dear Dr. Lehman:    Thank you for referring Anthony Tate to me for evaluation. Below are my notes for this consultation.    If you have questions, please do not hesitate to call me. I look forward to following your patient along with you.         Sincerely,        Brenton Armnado,         CC: DO Hector Vance MD Ciocca, Robin M, DO  2024 12:10 PM  Sign when Signing Visit   Anthony Tate is a 38 y.o. female who presents today for evaluation.     She is here for consultation at the request of Dr. Lehman.  She has a known BRCA1 mutation and recently had a hysterectomy and salpingo-oophorectomy.  She was recommended to see me to discuss breast cancer surveillance.  She denies any breast masses, nipple discharge or breast pain.  She had a mammogram in 2023 which showed no worrisome findings in either breast.  An MRI was recommended but this is yet to be reformed.    Past Medical History:    Past Medical History:   Diagnosis Date   • BRCA positive     thinks BRCA 1   • Breast cancer screening by mammogram 2023    BIRADS CATEGORY 2 - BENIGN FINDING (NOR NC D)HETEROGENEOUS   • GERD (gastroesophageal reflux disease)    • Hx of diagnostic mammography 2023    BIRADS CATEGORY 2 - BENIGN FINDING (NOR NC D)HETEROGENEOUS   • Pap smear for cervical cancer screening 2021    neg/hpv neg       OB History:   OB History    Para Term  AB Living   3 3 3     3   SAB IAB Ectopic Multiple Live Births           3      # Outcome Date GA Lbr Bro/2nd Weight Sex Delivery Anes PTL Lv   3 Term 10/04/15 39w0d  3.175 kg (7 lb) F Vag-Spont   SOY   2 Term 14 39w0d  3.175 kg (7 lb) F Vag-Spont   SOY   1 Term 02 41w0d  2.722 kg (6 lb) F Vag-Spont   SOY       Surgical History:  History reviewed. No pertinent surgical history.    Social History:   Social History     Social History Narrative   • Not on file       Family History:   Family History   Problem Relation Age of Onset   • Heart disease Paternal Grandfather    • Heart disease Maternal Grandfather    • Breast cancer Biological Mother 39        BRCA+   • Thyroid cancer Biological Mother 45   • Skin cancer Biological Mother 40   • Ovarian cancer Mother's Sister    • Colon cancer Neg Hx    • Cervical cancer Neg Hx    • Uterine cancer Neg Hx        Allergies: Patient has no known allergies.    Home Medications:  •  ibuprofen  •  omeprazole  •  estradioL      Review of Studies.  As above I personally reviewed her films and agree with the recommendation.     Review of Systems      She is a well-developed, well-nourished woman in no apparent distress. On HEENT exam there is no cervical adenopathy. There is no scleral icterus. She is alert and oriented times three. She has appropriate mood and affect. Neck is normal with full range of motion. There are no thyroid masses.  She has symmetric breasts. Her breast exam shows ***. Abdomen is soft and nontender without organomegaly. Bilateral lower extremities are without clubbing, cyanosis or edema. On musculoskeletal exam, she moves all extremities without any obvious abnormality; there is no scoliosis.     Impression:   Problem List Items Addressed This Visit    None      Plan: I explained to Anthony ***.

## 2024-01-30 NOTE — PROGRESS NOTES
Anthony Tate is a 38 y.o. female who presents today for evaluation.     She is here for consultation at the request of Dr. Lehmna.  She has a known BRCA1 mutation and recently had a hysterectomy and salpingo-oophorectomy.  She was recommended to see me to discuss breast cancer surveillance.  She denies any breast masses, nipple discharge or breast pain.  She had a mammogram in 2023 which showed no worrisome findings in either breast.  She has had MRIs in the past but not for several years.  She is scheduled for an MRI next week.  At this point she is ready to move forward with prophylactic surgery.    Past Medical History:    Past Medical History:   Diagnosis Date   • BRCA positive     thinks BRCA 1   • Breast cancer screening by mammogram 2023    BIRADS CATEGORY 2 - BENIGN FINDING (NOR NC D)HETEROGENEOUS   • GERD (gastroesophageal reflux disease)    • Hx of diagnostic mammography 2023    BIRADS CATEGORY 2 - BENIGN FINDING (NOR NC D)HETEROGENEOUS   • Pap smear for cervical cancer screening 2021    neg/hpv neg       OB History:   OB History    Para Term  AB Living   3 3 3     3   SAB IAB Ectopic Multiple Live Births           3      # Outcome Date GA Lbr Bro/2nd Weight Sex Delivery Anes PTL Lv   3 Term 10/04/15 39w0d  3.175 kg (7 lb) F Vag-Spont   SOY   2 Term 14 39w0d  3.175 kg (7 lb) F Vag-Spont   SOY   1 Term 02 41w0d  2.722 kg (6 lb) F Vag-Spont   SOY       Surgical History:   Past Surgical History:   Procedure Laterality Date   • HYSTERECTOMY         Social History:   Social History     Social History Narrative   • Not on file       Family History:   Family History   Problem Relation Age of Onset   • Breast cancer Biological Mother 39        BRCA+   • Thyroid cancer Biological Mother 45   • Skin cancer Biological Mother 40   • COPD Biological Father    • No Known Problems Biological Sister    • No Known Problems Biological Brother    • Heart  disease Maternal Grandfather    • Heart disease Paternal Grandfather    • Colon cancer Neg Hx    • Cervical cancer Neg Hx    • Uterine cancer Neg Hx        Allergies: Patient has no known allergies.    Home Medications:  •  ibuprofen  •  omeprazole  •  estradioL      Review of Studies.  As above I personally reviewed her films and agree with the recommendation.     Review of Systems   Constitutional: Negative for appetite change, chills, fatigue, fever and unexpected weight change.   HENT: Negative for congestion, facial swelling, hearing loss, mouth sores, sneezing and sore throat.    Eyes: Negative for discharge.   Respiratory: Negative for cough, chest tightness and shortness of breath.    Cardiovascular: Negative for chest pain.   Gastrointestinal: Negative for abdominal pain, blood in stool, constipation, diarrhea, nausea and rectal pain.   Genitourinary: Negative for difficulty urinating, dyspareunia and vaginal bleeding.   Musculoskeletal: Negative for back pain and joint swelling.   Allergic/Immunologic: Negative for environmental allergies and food allergies.   Neurological: Negative for dizziness, weakness, numbness and headaches.   Hematological: Does not bruise/bleed easily.   Psychiatric/Behavioral: Negative for behavioral problems, dysphoric mood and sleep disturbance. The patient is not nervous/anxious.    All other systems reviewed and are negative.        She is a well-developed, well-nourished woman in no apparent distress. On HEENT exam there is no cervical adenopathy. There is no scleral icterus. She is alert and oriented times three. She has appropriate mood and affect. Neck is normal with full range of motion. There are no thyroid masses.  She has symmetric breasts. Her breast exam shows no dominant masses, nipple discharge, skin retractions, supraclavicular or axillary adenopathy bilaterally.. Abdomen is soft and nontender without organomegaly. Bilateral lower extremities are without clubbing,  cyanosis or edema. On musculoskeletal exam, she moves all extremities without any obvious abnormality; there is no scoliosis.     Impression:   Problem List Items Addressed This Visit        Other    BRCA1 gene mutation positive in female - Primary       Plan: I explained to Anthony it is reasonable to proceed with bilateral prophylactic mastectomy given her personal history of a BRCA1 mutation.  An alternative would be to continue with increased surveillance including mammograms and MRIs spaced to every 6 months apart.  She understands this but would like to move forward.    I explained the procedure for a bilateral total mastectomy.  If her MRI and mammogram are reassuring she will not require lymph node sampling.  Given her significant ptosis and body habitus I do not feel she is a good candidate for nipple sparing technique but she will discuss this with plastic surgery.  We discussed options for reconstruction including free flap reconstruction versus implant-based techniques.  We will arrange for her to meet with Dr. Patton to discuss this further.  She would like to plan surgery sometime in April.  I asked her to give us a call after she has met with Dr. Patton and we will work on getting her surgical date.  We can also discuss any additional questions she has at that point.    All the questions that Anthony had were answered during the course of a 60-minute consultation which included review of her genetic testing results and relevant clinical information.  I urged her to call me if she any additional questions or concerns otherwise we would plan to proceed with her surgery sometime in April..

## 2024-02-06 NOTE — PROGRESS NOTES
She is scheduled to see Dr. Patton on 03/13/24, per marvin there are no dates available in April.  I told her the recon room right now is available every Tuesday in May.

## 2024-02-07 NOTE — PROGRESS NOTES
Kateryna emailed me back today, we can add the patient to the OR schedule on 05/14/24- let me know when you enter the prep for case and I will schedule the case.

## 2024-02-09 NOTE — PROGRESS NOTES
OR date given to me by Kateryna was 05/14/24. Maria Isabel placed a hold on the date for me.  Patient told me the MRI was already scheduled

## 2024-02-12 ENCOUNTER — TELEPHONE (OUTPATIENT)
Dept: OBSTETRICS AND GYNECOLOGY | Facility: CLINIC | Age: 39
End: 2024-02-12
Payer: COMMERCIAL

## 2024-02-12 ENCOUNTER — PATIENT OUTREACH (OUTPATIENT)
Dept: RADIOLOGY | Facility: HOSPITAL | Age: 39
End: 2024-02-12
Payer: COMMERCIAL

## 2024-02-12 NOTE — TELEPHONE ENCOUNTER
Called pt to let her know that I got the Breast MRI results and faxed them to Dr. Armando's office.  Per Dr. Waters the MRI saw an area in the right breast that should be Bx under MR guidance.  Pt will check with Dr. Armando office.    YOUSIFB

## 2024-02-14 ENCOUNTER — PATIENT OUTREACH (OUTPATIENT)
Dept: RADIOLOGY | Facility: HOSPITAL | Age: 39
End: 2024-02-14
Payer: COMMERCIAL

## 2024-02-14 DIAGNOSIS — R92.8 ABNORMAL MRI, BREAST: ICD-10-CM

## 2024-02-14 DIAGNOSIS — Z15.09 BRCA1 GENE MUTATION POSITIVE IN FEMALE: Primary | ICD-10-CM

## 2024-02-14 DIAGNOSIS — Z15.02 BRCA1 GENE MUTATION POSITIVE IN FEMALE: Primary | ICD-10-CM

## 2024-02-14 DIAGNOSIS — Z15.01 BRCA1 GENE MUTATION POSITIVE IN FEMALE: Primary | ICD-10-CM

## 2024-02-14 NOTE — PROGRESS NOTES
I spoke to Malgorzata regarding her recent MRI.  She had an MRI at [facility] last week which shows an area of enhancement in the right breast for which they are recommending a right MRI guided biopsy.  This is scheduled for next week at [facility] but she would like to try to do this through [facility].  She is going to drop off a CD later today.  We will have it reviewed and work on getting her scheduled for an MRI guided biopsy.

## 2024-02-20 ENCOUNTER — OFFICE VISIT (OUTPATIENT)
Dept: OBSTETRICS AND GYNECOLOGY | Facility: CLINIC | Age: 39
End: 2024-02-20
Payer: COMMERCIAL

## 2024-02-20 VITALS
WEIGHT: 262 LBS | BODY MASS INDEX: 43.65 KG/M2 | DIASTOLIC BLOOD PRESSURE: 72 MMHG | HEIGHT: 65 IN | SYSTOLIC BLOOD PRESSURE: 127 MMHG

## 2024-02-20 DIAGNOSIS — Z48.89 POSTOPERATIVE VISIT: Primary | ICD-10-CM

## 2024-02-20 PROCEDURE — 99024 POSTOP FOLLOW-UP VISIT: CPT | Performed by: OBSTETRICS & GYNECOLOGY

## 2024-02-20 RX ORDER — IBUPROFEN 600 MG/1
600 TABLET ORAL EVERY 6 HOURS PRN
COMMUNITY

## 2024-02-20 NOTE — PROGRESS NOTES
"CC: post-op visit    HPI: Ms. Anthony Tate is post-op from [facility], BSO, cysto on 12/28/2023 for AUB/ppx BSO due to BRCA 1.     She returns today for post-op visit. She denies vaginal bleeding, denies pelvic/abdominal pain/bloating, denies change in bowel/bladder habits, denies other suspicious symptoms.  She has resumed all normal activities.    Medical History:   Past Medical History:   Diagnosis Date   • BRCA positive     thinks BRCA 1   • Breast cancer screening by mammogram 02/22/2023    BIRADS CATEGORY 2 - BENIGN FINDING (NOR NC D)HETEROGENEOUS   • GERD (gastroesophageal reflux disease)    • Hx of diagnostic mammography 02/22/2023    BIRADS CATEGORY 2 - BENIGN FINDING (NOR NC D)HETEROGENEOUS   • Pap smear for cervical cancer screening 02/03/2021    neg/hpv neg       Surgical History:   Past Surgical History:   Procedure Laterality Date   • HYSTERECTOMY         Allergies: Patient has no known allergies.    Medications:   Current Outpatient Medications   Medication Sig Dispense Refill   • ibuprofen (MOTRIN) 600 mg tablet Take 600 mg by mouth every 6 (six) hours as needed.     • omeprazole (PriLOSEC) 40 mg capsule Take by mouth daily.     • estradioL (CLIMARA) 0.1 mg/24 hr Place 1 patch on the skin once a week. Start using patch 1 week post hysterectomy. (Patient not taking: Reported on 1/30/2024) 4 patch 11   • ibuprofen (MOTRIN) 600 mg tablet Take 1 tablet (600 mg total) by mouth 4 (four) times a day as needed (pain). 90 tablet 0     No current facility-administered medications for this visit.       Vital Signs:  Visit Vitals  /72 (BP Location: Right upper arm, Patient Position: Sitting)   Ht 1.651 m (5' 5\")   Wt 119 kg (262 lb)   LMP 12/20/2023 (Exact Date)   BMI 43.60 kg/m²       Physical Exam:  General: well-developed, well-nourished, no apparent distress  GI: laparoscopic incisions well healed  Gynecologic: normal external female genitalia.    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  Uterus (weight: 178 grams).  Cervix with Nabothian cysts, microglandular hyperplasia and focal tubal metaplasia.  Proliferative endometrium showing endometrial hyperplasia without atypia.  Myometrium with superficial adenomyosis.  Right ovary with follicle cysts.  Right fallopian tube with focal reactive changes.  Left ovary with follicle cysts and corpus luteum.  See immunohistochemical Section.    Pelvic Washings, pelvic washings, Fluids/Brushings Thinprep Only:   No malignant cells identified.    Assessment/Plan     Ms. Anthony Tate is a 38 y.o. lady who is healing well from surgery. She can resume her usual activities.     We discussed the finding of STIC tumor which does not require any additional follow up. Anthony would like to se a GYN oncologist just to review the path and discuss any other insight they have which is reasonable. Provided contact information for this.    Separately she is undergoing a MR guided breast biopsy this week due to a finding in a recent breast MRI. Until this is completed and we know there is no concern for malignancy she is holding off on starting HRT. Additionally she is planning for ppx bilateral mastectomy in April. She plans to likely hold off on HRT until then as well for peace of mind. She is currently managing surgical menopause symptoms okay for the time being.    Anthony may return to me for routine Gyn care in 1 year or as needed.    Meghna Strong,

## 2024-02-23 ENCOUNTER — TELEPHONE (OUTPATIENT)
Dept: OBSTETRICS AND GYNECOLOGY | Facility: CLINIC | Age: 39
End: 2024-02-23
Payer: COMMERCIAL

## 2024-02-23 ENCOUNTER — TELEPHONE (OUTPATIENT)
Dept: SURGERY | Facility: CLINIC | Age: 39
End: 2024-02-23
Payer: COMMERCIAL

## 2024-02-23 NOTE — TELEPHONE ENCOUNTER
Pt called to inquire abt obtaining her records in order to go to [facility] for f/u. Pt informed that she will receive a call to inform her what the best and fastest way to get records will be

## 2024-02-23 NOTE — TELEPHONE ENCOUNTER
Anthony received her results from [facility] and her biopsy was consistent with breast cancer.  Currently she does not have any additional information and the results are not available on epic.  I offered to see her in the office next week and help her get the biopsy results reviewed by our system.  She has an appointment at [facility] next week and at this point plans to keep that appointment.  I will have the nurse navigators give her a call next week to check in.  If she would like to have her care through [facility] I will see her in the office next week.

## 2024-03-04 ENCOUNTER — TELEPHONE (OUTPATIENT)
Dept: OBSTETRICS AND GYNECOLOGY | Facility: CLINIC | Age: 39
End: 2024-03-04
Payer: COMMERCIAL

## 2024-03-04 DIAGNOSIS — R10.2 PELVIC PAIN: Primary | ICD-10-CM

## 2024-03-04 NOTE — TELEPHONE ENCOUNTER
I spoke with Anthony, she states that this pain started after her post-op visit. She describes it as a constant cramping with an occasional burning sensation. She cannot identify any worsening factors. She states her bowels are normal. Discussed with NAB, likely adhesions or GI. Pt can have pelvic US to r/o any masses or collections in the abdomen, but should still have appt with ELS next week to follow-up. Pt comfortable with plan. Us ordered, appt scheduled.

## 2024-03-04 NOTE — TELEPHONE ENCOUNTER
Pt called stating that she is having lowr abdominal pain daily and it feels like cramps. She says that she gets burning off and on

## 2024-03-18 ENCOUNTER — APPOINTMENT (RX ONLY)
Dept: URBAN - METROPOLITAN AREA CLINIC 28 | Facility: CLINIC | Age: 39
Setting detail: DERMATOLOGY
End: 2024-03-18

## 2024-03-18 DIAGNOSIS — L82.0 INFLAMED SEBORRHEIC KERATOSIS: ICD-10-CM

## 2024-03-18 DIAGNOSIS — L81.4 OTHER MELANIN HYPERPIGMENTATION: ICD-10-CM

## 2024-03-18 PROCEDURE — ? MEDICATION COUNSELING

## 2024-03-18 PROCEDURE — ? PRESCRIPTION

## 2024-03-18 PROCEDURE — 99212 OFFICE O/P EST SF 10 MIN: CPT | Mod: 25

## 2024-03-18 PROCEDURE — 17110 DESTRUCTION B9 LES UP TO 14: CPT

## 2024-03-18 PROCEDURE — ? COUNSELING

## 2024-03-18 PROCEDURE — ? LIQUID NITROGEN

## 2024-03-18 PROCEDURE — ? PRESCRIPTION MEDICATION MANAGEMENT

## 2024-03-18 RX ORDER — TRETINOIN 0.25 MG/G
CREAM TOPICAL
Qty: 45 | Refills: 3 | Status: CANCELLED | COMMUNITY
Start: 2024-03-18

## 2024-03-18 RX ADMIN — TRETINOIN: 0.25 CREAM TOPICAL at 00:00

## 2024-03-18 ASSESSMENT — LOCATION SIMPLE DESCRIPTION DERM
LOCATION SIMPLE: SCALP
LOCATION SIMPLE: RIGHT CHEEK
LOCATION SIMPLE: LEFT CHEEK

## 2024-03-18 ASSESSMENT — LOCATION DETAILED DESCRIPTION DERM
LOCATION DETAILED: LEFT SUPERIOR NASAL CHEEK
LOCATION DETAILED: RIGHT INFERIOR CENTRAL MALAR CHEEK
LOCATION DETAILED: RIGHT CENTRAL FRONTAL SCALP

## 2024-03-18 ASSESSMENT — LOCATION ZONE DERM
LOCATION ZONE: FACE
LOCATION ZONE: SCALP

## 2024-03-18 NOTE — PROCEDURE: MEDICATION COUNSELING
Hydroquinone Counseling:  Patient advised that medication may result in skin irritation, lightening (hypopigmentation), dryness, and burning.  In the event of skin irritation, the patient was advised to reduce the amount of the drug applied or use it less frequently.  Rarely, spots that are treated with hydroquinone can become darker (pseudoochronosis).  Should this occur, patient instructed to stop medication and call the office. The patient verbalized understanding of the proper use and possible adverse effects of hydroquinone.  All of the patient's questions and concerns were addressed.
Detail Level: Simple
Cephalexin Pregnancy And Lactation Text: This medication is Pregnancy Category B and considered safe during pregnancy.  It is also excreted in breast milk but can be used safely for shorter doses.
Mirvaso Pregnancy And Lactation Text: This medication has not been assigned a Pregnancy Risk Category. It is unknown if the medication is excreted in breast milk.
Cantharidin Counseling:  I discussed with the patient the risks of Cantharidin including but not limited to pain, redness, burning, itching, and blistering.
Albendazole Counseling:  I discussed with the patient the risks of albendazole including but not limited to cytopenia, kidney damage, nausea/vomiting and severe allergy.  The patient understands that this medication is being used in an off-label manner.
Rhofade Counseling: Rhofade is a topical medication which can decrease superficial blood flow where applied. Side effects are uncommon and include stinging, redness and allergic reactions.
Aklief counseling:  Patient advised to apply a pea-sized amount only at bedtime and wait 30 minutes after washing their face before applying.  If too drying, patient may add a non-comedogenic moisturizer.  The most commonly reported side effects including irritation, redness, scaling, dryness, stinging, burning, itching, and increased risk of sunburn.  The patient verbalized understanding of the proper use and possible adverse effects of retinoids.  All of the patient's questions and concerns were addressed.
Low Dose Naltrexone Counseling- I discussed with the patient the potential risks and side effects of low dose naltrexone including but not limited to: more vivid dreams, headaches, nausea, vomiting, abdominal pain, fatigue, dizziness, and anxiety.
Minocycline Counseling: Patient advised regarding possible photosensitivity and discoloration of the teeth, skin, lips, tongue and gums.  Patient instructed to avoid sunlight, if possible.  When exposed to sunlight, patients should wear protective clothing, sunglasses, and sunscreen.  The patient was instructed to call the office immediately if the following severe adverse effects occur:  hearing changes, easy bruising/bleeding, severe headache, or vision changes.  The patient verbalized understanding of the proper use and possible adverse effects of minocycline.  All of the patient's questions and concerns were addressed.
Azathioprine Counseling:  I discussed with the patient the risks of azathioprine including but not limited to myelosuppression, immunosuppression, hepatotoxicity, lymphoma, and infections.  The patient understands that monitoring is required including baseline LFTs, Creatinine, possible TPMP genotyping and weekly CBCs for the first month and then every 2 weeks thereafter.  The patient verbalized understanding of the proper use and possible adverse effects of azathioprine.  All of the patient's questions and concerns were addressed.
Olumiant Counseling: I discussed with the patient the risks of Olumiant therapy including but not limited to upper respiratory tract infections, shingles, cold sores, and nausea. Live vaccines should be avoided.  This medication has been linked to serious infections; higher rate of mortality; malignancy and lymphoproliferative disorders; major adverse cardiovascular events; thrombosis; gastrointestinal perforations; neutropenia; lymphopenia; anemia; liver enzyme elevations; and lipid elevations.
Oral Minoxidil Pregnancy And Lactation Text: This medication should only be used when clearly needed if you are pregnant, attempting to become pregnant or breast feeding.
Terbinafine Counseling: Patient counseling regarding adverse effects of terbinafine including but not limited to headache, diarrhea, rash, upset stomach, liver function test abnormalities, itching, taste/smell disturbance, nausea, abdominal pain, and flatulence.  There is a rare possibility of liver failure that can occur when taking terbinafine.  The patient understands that a baseline LFT and kidney function test may be required. The patient verbalized understanding of the proper use and possible adverse effects of terbinafine.  All of the patient's questions and concerns were addressed.
Zyclara Pregnancy And Lactation Text: This medication is Pregnancy Category C. It is unknown if this medication is excreted in breast milk.
Tazorac Pregnancy And Lactation Text: This medication is not safe during pregnancy. It is unknown if this medication is excreted in breast milk.
Wartpeel Counseling:  I discussed with the patient the risks of Wartpeel including but not limited to erythema, scaling, itching, weeping, crusting, and pain.
Thalidomide Counseling: I discussed with the patient the risks of thalidomide including but not limited to birth defects, anxiety, weakness, chest pain, dizziness, cough and severe allergy.
Colchicine Pregnancy And Lactation Text: This medication is Pregnancy Category C and isn't considered safe during pregnancy. It is excreted in breast milk.
Topical Clindamycin Counseling: Patient counseled that this medication may cause skin irritation or allergic reactions.  In the event of skin irritation, the patient was advised to reduce the amount of the drug applied or use it less frequently.   The patient verbalized understanding of the proper use and possible adverse effects of clindamycin.  All of the patient's questions and concerns were addressed.
Wartpeel Pregnancy And Lactation Text: This medication is Pregnancy Category X and contraindicated in pregnancy and in women who may become pregnant. It is unknown if this medication is excreted in breast milk.
Erivedge Counseling- I discussed with the patient the risks of Erivedge including but not limited to nausea, vomiting, diarrhea, constipation, weight loss, changes in the sense of taste, decreased appetite, muscle spasms, and hair loss.  The patient verbalized understanding of the proper use and possible adverse effects of Erivedge.  All of the patient's questions and concerns were addressed.
Dapsone Counseling: I discussed with the patient the risks of dapsone including but not limited to hemolytic anemia, agranulocytosis, rashes, methemoglobinemia, kidney failure, peripheral neuropathy, headaches, GI upset, and liver toxicity.  Patients who start dapsone require monitoring including baseline LFTs and weekly CBCs for the first month, then every month thereafter.  The patient verbalized understanding of the proper use and possible adverse effects of dapsone.  All of the patient's questions and concerns were addressed.
Minocycline Pregnancy And Lactation Text: This medication is Pregnancy Category D and not consider safe during pregnancy. It is also excreted in breast milk.
Cantharidin Pregnancy And Lactation Text: This medication has not been proven safe during pregnancy. It is unknown if this medication is excreted in breast milk.
Low Dose Naltrexone Pregnancy And Lactation Text: Naltrexone is pregnancy category C.  There have been no adequate and well-controlled studies in pregnant women.  It should be used in pregnancy only if the potential benefit justifies the potential risk to the fetus.   Limited data indicates that naltrexone is minimally excreted into breastmilk.
Cimzia Pregnancy And Lactation Text: This medication crosses the placenta but can be considered safe in certain situations. Cimzia may be excreted in breast milk.
Dutasteride Pregnancy And Lactation Text: This medication is absolutely contraindicated in women, especially during pregnancy and breast feeding. Feminization of male fetuses is possible if taking while pregnant.
Methotrexate Counseling:  Patient counseled regarding adverse effects of methotrexate including but not limited to nausea, vomiting, abnormalities in liver function tests. Patients may develop mouth sores, rash, diarrhea, and abnormalities in blood counts. The patient understands that monitoring is required including LFT's and blood counts.  There is a rare possibility of scarring of the liver and lung problems that can occur when taking methotrexate. Persistent nausea, loss of appetite, pale stools, dark urine, cough, and shortness of breath should be reported immediately. Patient advised to discontinue methotrexate treatment at least three months before attempting to become pregnant.  I discussed the need for folate supplements while taking methotrexate.  These supplements can decrease side effects during methotrexate treatment. The patient verbalized understanding of the proper use and possible adverse effects of methotrexate.  All of the patient's questions and concerns were addressed.
Skyrizi Pregnancy And Lactation Text: The risk during pregnancy and breastfeeding is uncertain with this medication.
Infliximab Counseling:  I discussed with the patient the risks of infliximab including but not limited to myelosuppression, immunosuppression, autoimmune hepatitis, demyelinating diseases, lymphoma, and serious infections.  The patient understands that monitoring is required including a PPD at baseline and must alert us or the primary physician if symptoms of infection or other concerning signs are noted.
Hydroquinone Pregnancy And Lactation Text: This medication has not been assigned a Pregnancy Risk Category but animal studies failed to show danger with the topical medication. It is unknown if the medication is excreted in breast milk.
5-Fu Counseling: 5-Fluorouracil Counseling:  I discussed with the patient the risks of 5-fluorouracil including but not limited to erythema, scaling, itching, weeping, crusting, and pain.
Opioid Counseling: I discussed with the patient the potential side effects of opioids including but not limited to addiction, altered mental status, and depression. I stressed avoiding alcohol, benzodiazepines, muscle relaxants and sleep aids unless specifically okayed by a physician. The patient verbalized understanding of the proper use and possible adverse effects of opioids. All of the patient's questions and concerns were addressed. They were instructed to flush the remaining pills down the toilet if they did not need them for pain.
Include Pregnancy/Lactation Warning?: No
Stelara Counseling:  I discussed with the patient the risks of ustekinumab including but not limited to immunosuppression, malignancy, posterior leukoencephalopathy syndrome, and serious infections.  The patient understands that monitoring is required including a PPD at baseline and must alert us or the primary physician if symptoms of infection or other concerning signs are noted.
Infliximab Pregnancy And Lactation Text: This medication is Pregnancy Category B and is considered safe during pregnancy. It is unknown if this medication is excreted in breast milk.
Aklief Pregnancy And Lactation Text: It is unknown if this medication is safe to use during pregnancy.  It is unknown if this medication is excreted in breast milk.  Breastfeeding women should use the topical cream on the smallest area of the skin for the shortest time needed while breastfeeding.  Do not apply to nipple and areola.
Terbinafine Pregnancy And Lactation Text: This medication is Pregnancy Category B and is considered safe during pregnancy. It is also excreted in breast milk and breast feeding isn't recommended.
Opzelura Counseling:  I discussed with the patient the risks of Opzelura including but not limited to nasopharngitis, bronchitis, ear infection, eosinophila, hives, diarrhea, folliculitis, tonsillitis, and rhinorrhea.  Taken orally, this medication has been linked to serious infections; higher rate of mortality; malignancy and lymphoproliferative disorders; major adverse cardiovascular events; thrombosis; thrombocytopenia, anemia, and neutropenia; and lipid elevations.
Albendazole Pregnancy And Lactation Text: This medication is Pregnancy Category C and it isn't known if it is safe during pregnancy. It is also excreted in breast milk.
Clindamycin Counseling: I counseled the patient regarding use of clindamycin as an antibiotic for prophylactic and/or therapeutic purposes. Clindamycin is active against numerous classes of bacteria, including skin bacteria. Side effects may include nausea, diarrhea, gastrointestinal upset, rash, hives, yeast infections, and in rare cases, colitis.
Fluconazole Counseling:  Patient counseled regarding adverse effects of fluconazole including but not limited to headache, diarrhea, nausea, upset stomach, liver function test abnormalities, taste disturbance, and stomach pain.  There is a rare possibility of liver failure that can occur when taking fluconazole.  The patient understands that monitoring of LFTs and kidney function test may be required, especially at baseline. The patient verbalized understanding of the proper use and possible adverse effects of fluconazole.  All of the patient's questions and concerns were addressed.
Olumiant Pregnancy And Lactation Text: Based on animal studies, Olumiant may cause embryo-fetal harm when administered to pregnant women.  The medication should not be used in pregnancy.  Breastfeeding is not recommended during treatment.
Otezla Counseling: The side effects of Otezla were discussed with the patient, including but not limited to worsening or new depression, weight loss, diarrhea, nausea, upper respiratory tract infection, and headache. Patient instructed to call the office should any adverse effect occur.  The patient verbalized understanding of the proper use and possible adverse effects of Otezla.  All the patient's questions and concerns were addressed.
Thalidomide Pregnancy And Lactation Text: This medication is Pregnancy Category X and is absolutely contraindicated during pregnancy. It is unknown if it is excreted in breast milk.
Acitretin Counseling:  I discussed with the patient the risks of acitretin including but not limited to hair loss, dry lips/skin/eyes, liver damage, hyperlipidemia, depression/suicidal ideation, photosensitivity.  Serious rare side effects can include but are not limited to pancreatitis, pseudotumor cerebri, bony changes, clot formation/stroke/heart attack.  Patient understands that alcohol is contraindicated since it can result in liver toxicity and significantly prolong the elimination of the drug by many years.
Topical Clindamycin Pregnancy And Lactation Text: This medication is Pregnancy Category B and is considered safe during pregnancy. It is unknown if it is excreted in breast milk.
Quinolones Counseling:  I discussed with the patient the risks of fluoroquinolones including but not limited to GI upset, allergic reaction, drug rash, diarrhea, dizziness, photosensitivity, yeast infections, liver function test abnormalities, tendonitis/tendon rupture.
Dapsone Pregnancy And Lactation Text: This medication is Pregnancy Category C and is not considered safe during pregnancy or breast feeding.
Niacinamide Counseling: I recommended taking niacin or niacinamide, also know as vitamin B3, twice daily. Recent evidence suggests that taking vitamin B3 (500 mg twice daily) can reduce the risk of actinic keratoses and non-melanoma skin cancers. Side effects of vitamin B3 include flushing and headache.
Cosentyx Counseling:  I discussed with the patient the risks of Cosentyx including but not limited to worsening of Crohn's disease, immunosuppression, allergic reactions and infections.  The patient understands that monitoring is required including a PPD at baseline and must alert us or the primary physician if symptoms of infection or other concerning signs are noted.
Bexarotene Counseling:  I discussed with the patient the risks of bexarotene including but not limited to hair loss, dry lips/skin/eyes, liver abnormalities, hyperlipidemia, pancreatitis, depression/suicidal ideation, photosensitivity, drug rash/allergic reactions, hypothyroidism, anemia, leukopenia, infection, cataracts, and teratogenicity.  Patient understands that they will need regular blood tests to check lipid profile, liver function tests, white blood cell count, thyroid function tests and pregnancy test if applicable.
Finasteride Male Counseling: Finasteride Counseling:  I discussed with the patient the risks of use of finasteride including but not limited to decreased libido, decreased ejaculate volume, gynecomastia, and depression. Women should not handle medication.  All of the patient's questions and concerns were addressed.
Methotrexate Pregnancy And Lactation Text: This medication is Pregnancy Category X and is known to cause fetal harm. This medication is excreted in breast milk.
Winlevi Counseling:  I discussed with the patient the risks of topical clascoterone including but not limited to erythema, scaling, itching, and stinging. Patient voiced their understanding.
Finasteride Female Counseling: Finasteride Counseling:  I discussed with the patient the risks of use of finasteride including but not limited to decreased libido and sexual dysfunction. Explained the teratogenic nature of the medication and stressed the importance of not getting pregnant during treatment. All of the patient's questions and concerns were addressed.
Opioid Pregnancy And Lactation Text: These medications can lead to premature delivery and should be avoided during pregnancy. These medications are also present in breast milk in small amounts.
Prednisone Counseling:  I discussed with the patient the risks of prolonged use of prednisone including but not limited to weight gain, insomnia, osteoporosis, mood changes, diabetes, susceptibility to infection, glaucoma and high blood pressure.  In cases where prednisone use is prolonged, patients should be monitored with blood pressure checks, serum glucose levels and an eye exam.  Additionally, the patient may need to be placed on GI prophylaxis, PCP prophylaxis, and calcium and vitamin D supplementation and/or a bisphosphonate.  The patient verbalized understanding of the proper use and the possible adverse effects of prednisone.  All of the patient's questions and concerns were addressed.
Rituxan Counseling:  I discussed with the patient the risks of Rituxan infusions. Side effects can include infusion reactions, severe drug rashes including mucocutaneous reactions, reactivation of latent hepatitis and other infections and rarely progressive multifocal leukoencephalopathy.  All of the patient's questions and concerns were addressed.
Imiquimod Counseling:  I discussed with the patient the risks of imiquimod including but not limited to erythema, scaling, itching, weeping, crusting, and pain.  Patient understands that the inflammatory response to imiquimod is variable from person to person and was educated regarded proper titration schedule.  If flu-like symptoms develop, patient knows to discontinue the medication and contact us.
Azelaic Acid Counseling: Patient counseled that medicine may cause skin irritation and to avoid applying near the eyes.  In the event of skin irritation, the patient was advised to reduce the amount of the drug applied or use it less frequently.   The patient verbalized understanding of the proper use and possible adverse effects of azelaic acid.  All of the patient's questions and concerns were addressed.
Clindamycin Pregnancy And Lactation Text: This medication can be used in pregnancy if certain situations. Clindamycin is also present in breast milk.
Ivermectin Counseling:  Patient instructed to take medication on an empty stomach with a full glass of water.  Patient informed of potential adverse effects including but not limited to nausea, diarrhea, dizziness, itching, and swelling of the extremities or lymph nodes.  The patient verbalized understanding of the proper use and possible adverse effects of ivermectin.  All of the patient's questions and concerns were addressed.
Solaraze Counseling:  I discussed with the patient the risks of Solaraze including but not limited to erythema, scaling, itching, weeping, crusting, and pain.
Fluconazole Pregnancy And Lactation Text: This medication is Pregnancy Category C and it isn't know if it is safe during pregnancy. It is also excreted in breast milk.
Opzelura Pregnancy And Lactation Text: There is insufficient data to evaluate drug-associated risk for major birth defects, miscarriage, or other adverse maternal or fetal outcomes.  There is a pregnancy registry that monitors pregnancy outcomes in pregnant persons exposed to the medication during pregnancy.  It is unknown if this medication is excreted in breast milk.  Do not breastfeed during treatment and for about 4 weeks after the last dose.
Otezla Pregnancy And Lactation Text: This medication is Pregnancy Category C and it isn't known if it is safe during pregnancy. It is unknown if it is excreted in breast milk.
Rinvoq Counseling: I discussed with the patient the risks of Rinvoq therapy including but not limited to upper respiratory tract infections, shingles, cold sores, bronchitis, nausea, cough, fever, acne, and headache. Live vaccines should be avoided.  This medication has been linked to serious infections; higher rate of mortality; malignancy and lymphoproliferative disorders; major adverse cardiovascular events; thrombosis; thrombocytopenia, anemia, and neutropenia; lipid elevations; liver enzyme elevations; and gastrointestinal perforations.
Tranexamic Acid Counseling:  Patient advised of the small risk of bleeding problems with tranexamic acid. They were also instructed to call if they developed any nausea, vomiting or diarrhea. All of the patient's questions and concerns were addressed.
Tranexamic Acid Pregnancy And Lactation Text: It is unknown if this medication is safe during pregnancy or breast feeding.
Solaraze Pregnancy And Lactation Text: This medication is Pregnancy Category B and is considered safe. There is some data to suggest avoiding during the third trimester. It is unknown if this medication is excreted in breast milk.
Griseofulvin Counseling:  I discussed with the patient the risks of griseofulvin including but not limited to photosensitivity, cytopenia, liver damage, nausea/vomiting and severe allergy.  The patient understands that this medication is best absorbed when taken with a fatty meal (e.g., ice cream or french fries).
Doxycycline Counseling:  Patient counseled regarding possible photosensitivity and increased risk for sunburn.  Patient instructed to avoid sunlight, if possible.  When exposed to sunlight, patients should wear protective clothing, sunglasses, and sunscreen.  The patient was instructed to call the office immediately if the following severe adverse effects occur:  hearing changes, easy bruising/bleeding, severe headache, or vision changes.  The patient verbalized understanding of the proper use and possible adverse effects of doxycycline.  All of the patient's questions and concerns were addressed.
Topical Ketoconazole Counseling: Patient counseled that this medication may cause skin irritation or allergic reactions.  In the event of skin irritation, the patient was advised to reduce the amount of the drug applied or use it less frequently.   The patient verbalized understanding of the proper use and possible adverse effects of ketoconazole.  All of the patient's questions and concerns were addressed.
Libtayo Counseling- I discussed with the patient the risks of Libtayo including but not limited to nausea, vomiting, diarrhea, and bone or muscle pain.  The patient verbalized understanding of the proper use and possible adverse effects of Libtayo.  All of the patient's questions and concerns were addressed.
Gabapentin Counseling: I discussed with the patient the risks of gabapentin including but not limited to dizziness, somnolence, fatigue and ataxia.
Niacinamide Pregnancy And Lactation Text: These medications are considered safe during pregnancy.
Oxybutynin Counseling:  I discussed with the patient the risks of oxybutynin including but not limited to skin rash, drowsiness, dry mouth, difficulty urinating, and blurred vision.
Rinvoq Pregnancy And Lactation Text: Based on animal studies, Rinvoq may cause embryo-fetal harm when administered to pregnant women.  The medication should not be used in pregnancy.  Breastfeeding is not recommended during treatment and for 6 days after the last dose.
Acitretin Pregnancy And Lactation Text: This medication is Pregnancy Category X and should not be given to women who are pregnant or may become pregnant in the future. This medication is excreted in breast milk.
Bexarotene Pregnancy And Lactation Text: This medication is Pregnancy Category X and should not be given to women who are pregnant or may become pregnant. This medication should not be used if you are breast feeding.
Winlevi Pregnancy And Lactation Text: This medication is considered safe during pregnancy and breastfeeding.
Cimetidine Counseling:  I discussed with the patient the risks of Cimetidine including but not limited to gynecomastia, headache, diarrhea, nausea, drowsiness, arrhythmias, pancreatitis, skin rashes, psychosis, bone marrow suppression and kidney toxicity.
Azathioprine Pregnancy And Lactation Text: This medication is Pregnancy Category D and isn't considered safe during pregnancy. It is unknown if this medication is excreted in breast milk.
Cellcept Counseling:  I discussed with the patient the risks of mycophenolate mofetil including but not limited to infection/immunosuppression, GI upset, hypokalemia, hypercholesterolemia, bone marrow suppression, lymphoproliferative disorders, malignancy, GI ulceration/bleed/perforation, colitis, interstitial lung disease, kidney failure, progressive multifocal leukoencephalopathy, and birth defects.  The patient understands that monitoring is required including a baseline creatinine and regular CBC testing. In addition, patient must alert us immediately if symptoms of infection or other concerning signs are noted.
Isotretinoin Counseling: Patient should get monthly blood tests, not donate blood, not drive at night if vision affected, not share medication, and not undergo elective surgery for 6 months after tx completed. Side effects reviewed, pt to contact office should one occur.
Rituxan Pregnancy And Lactation Text: This medication is Pregnancy Category C and it isn't know if it is safe during pregnancy. It is unknown if this medication is excreted in breast milk but similar antibodies are known to be excreted.
Rifampin Counseling: I discussed with the patient the risks of rifampin including but not limited to liver damage, kidney damage, red-orange body fluids, nausea/vomiting and severe allergy.
Dupixent Counseling: I discussed with the patient the risks of dupilumab including but not limited to eye inflammation and irritation, cold sores, injection site reactions, allergic reactions and increased risk of parasitic infection. The patient understands that monitoring is required and they must alert us or the primary physician if symptoms of infection or other concerning signs are noted.
Picato Counseling:  I discussed with the patient the risks of Picato including but not limited to erythema, scaling, itching, weeping, crusting, and pain.
Drysol Counseling:  I discussed with the patient the risks of drysol/aluminum chloride including but not limited to skin rash, itching, irritation, burning.
Finasteride Pregnancy And Lactation Text: This medication is absolutely contraindicated during pregnancy. It is unknown if it is excreted in breast milk.
Prednisone Pregnancy And Lactation Text: This medication is Pregnancy Category C and it isn't know if it is safe during pregnancy. This medication is excreted in breast milk.
Azelaic Acid Pregnancy And Lactation Text: This medication is considered safe during pregnancy and breast feeding.
Taltz Counseling: I discussed with the patient the risks of ixekizumab including but not limited to immunosuppression, serious infections, worsening of inflammatory bowel disease and drug reactions.  The patient understands that monitoring is required including a PPD at baseline and must alert us or the primary physician if symptoms of infection or other concerning signs are noted.
Klisyri Counseling:  I discussed with the patient the risks of Klisyri including but not limited to erythema, scaling, itching, weeping, crusting, and pain.
Azithromycin Counseling:  I discussed with the patient the risks of azithromycin including but not limited to GI upset, allergic reaction, drug rash, diarrhea, and yeast infections.
Doxycycline Pregnancy And Lactation Text: This medication is Pregnancy Category D and not consider safe during pregnancy. It is also excreted in breast milk but is considered safe for shorter treatment courses.
Soolantra Counseling: I discussed with the patients the risks of topial Soolantra. This is a medicine which decreases the number of mites and inflammation in the skin. You experience burning, stinging, eye irritation or allergic reactions.  Please call our office if you develop any problems from using this medication.
Nsaids Counseling: NSAID Counseling: I discussed with the patient that NSAIDs should be taken with food. Prolonged use of NSAIDs can result in the development of stomach ulcers.  Patient advised to stop taking NSAIDs if abdominal pain occurs.  The patient verbalized understanding of the proper use and possible adverse effects of NSAIDs.  All of the patient's questions and concerns were addressed.
Arava Counseling:  Patient counseled regarding adverse effects of Arava including but not limited to nausea, vomiting, abnormalities in liver function tests. Patients may develop mouth sores, rash, diarrhea, and abnormalities in blood counts. The patient understands that monitoring is required including LFTs and blood counts.  There is a rare possibility of scarring of the liver and lung problems that can occur when taking methotrexate. Persistent nausea, loss of appetite, pale stools, dark urine, cough, and shortness of breath should be reported immediately. Patient advised to discontinue Arava treatment and consult with a physician prior to attempting conception. The patient will have to undergo a treatment to eliminate Arava from the body prior to conception.
VTAMA Counseling: I discussed with the patient that VTAMA is not for use in the eyes, mouth or mouth. They should call the office if they develop any signs of allergic reactions to VTAMA. The patient verbalized understanding of the proper use and possible adverse effects of VTAMA.  All of the patient's questions and concerns were addressed.
Sotyktu Counseling:  I discussed the most common side effects of Sotyktu including: common cold, sore throat, sinus infections, cold sores, canker sores, folliculitis, and acne.  I also discussed more serious side effects of Sotyktu including but not limited to: serious allergic reactions; increased risk for infections such as TB; cancers such as lymphomas; rhabdomyolysis and elevated CPK; and elevated triglycerides and liver enzymes. 
Griseofulvin Pregnancy And Lactation Text: This medication is Pregnancy Category X and is known to cause serious birth defects. It is unknown if this medication is excreted in breast milk but breast feeding should be avoided.
Valtrex Counseling: I discussed with the patient the risks of valacyclovir including but not limited to kidney damage, nausea, vomiting and severe allergy.  The patient understands that if the infection seems to be worsening or is not improving, they are to call.
Libtayo Pregnancy And Lactation Text: This medication is contraindicated in pregnancy and when breast feeding.
Vtama Pregnancy And Lactation Text: It is unknown if this medication can cause problems during pregnancy and breastfeeding.
Dupixent Pregnancy And Lactation Text: This medication likely crosses the placenta but the risk for the fetus is uncertain. This medication is excreted in breast milk.
Topical Metronidazole Counseling: Metronidazole is a topical antibiotic medication. You may experience burning, stinging, redness, or allergic reactions.  Please call our office if you develop any problems from using this medication.
Glycopyrrolate Counseling:  I discussed with the patient the risks of glycopyrrolate including but not limited to skin rash, drowsiness, dry mouth, difficulty urinating, and blurred vision.
Adbry Counseling: I discussed with the patient the risks of tralokinumab including but not limited to eye infection and irritation, cold sores, injection site reactions, worsening of asthma, allergic reactions and increased risk of parasitic infection.  Live vaccines should be avoided while taking tralokinumab. The patient understands that monitoring is required and they must alert us or the primary physician if symptoms of infection or other concerning signs are noted.
Rifampin Pregnancy And Lactation Text: This medication is Pregnancy Category C and it isn't know if it is safe during pregnancy. It is also excreted in breast milk and should not be used if you are breast feeding.
Nsaids Pregnancy And Lactation Text: These medications are considered safe up to 30 weeks gestation. It is excreted in breast milk.
Birth Control Pills Counseling: Birth Control Pill Counseling: I discussed with the patient the potential side effects of OCPs including but not limited to increased risk of stroke, heart attack, thrombophlebitis, deep venous thrombosis, hepatic adenomas, breast changes, GI upset, headaches, and depression.  The patient verbalized understanding of the proper use and possible adverse effects of OCPs. All of the patient's questions and concerns were addressed.
Doxepin Counseling:  Patient advised that the medication is sedating and not to drive a car after taking this medication. Patient informed of potential adverse effects including but not limited to dry mouth, urinary retention, and blurry vision.  The patient verbalized understanding of the proper use and possible adverse effects of doxepin.  All of the patient's questions and concerns were addressed.
Isotretinoin Pregnancy And Lactation Text: This medication is Pregnancy Category X and is considered extremely dangerous during pregnancy. It is unknown if it is excreted in breast milk.
Benzoyl Peroxide Counseling: Patient counseled that medicine may cause skin irritation and bleach clothing.  In the event of skin irritation, the patient was advised to reduce the amount of the drug applied or use it less frequently.   The patient verbalized understanding of the proper use and possible adverse effects of benzoyl peroxide.  All of the patient's questions and concerns were addressed.
Siliq Counseling:  I discussed with the patient the risks of Siliq including but not limited to new or worsening depression, suicidal thoughts and behavior, immunosuppression, malignancy, posterior leukoencephalopathy syndrome, and serious infections.  The patient understands that monitoring is required including a PPD at baseline and must alert us or the primary physician if symptoms of infection or other concerning signs are noted. There is also a special program designed to monitor depression which is required with Siliq.
Elidel Counseling: Patient may experience a mild burning sensation during topical application. Elidel is not approved in children less than 2 years of age. There have been case reports of hematologic and skin malignancies in patients using topical calcineurin inhibitors although causality is questionable.
Azithromycin Pregnancy And Lactation Text: This medication is considered safe during pregnancy and is also secreted in breast milk.
Valtrex Pregnancy And Lactation Text: this medication is Pregnancy Category B and is considered safe during pregnancy. This medication is not directly found in breast milk but it's metabolite acyclovir is present.
Humira Counseling:  I discussed with the patient the risks of adalimumab including but not limited to myelosuppression, immunosuppression, autoimmune hepatitis, demyelinating diseases, lymphoma, and serious infections.  The patient understands that monitoring is required including a PPD at baseline and must alert us or the primary physician if symptoms of infection or other concerning signs are noted.
Benzoyl Peroxide Pregnancy And Lactation Text: This medication is Pregnancy Category C. It is unknown if benzoyl peroxide is excreted in breast milk.
Protopic Counseling: Patient may experience a mild burning sensation during topical application. Protopic is not approved in children less than 2 years of age. There have been case reports of hematologic and skin malignancies in patients using topical calcineurin inhibitors although causality is questionable.
Odomzo Counseling- I discussed with the patient the risks of Odomzo including but not limited to nausea, vomiting, diarrhea, constipation, weight loss, changes in the sense of taste, decreased appetite, muscle spasms, and hair loss.  The patient verbalized understanding of the proper use and possible adverse effects of Odomzo.  All of the patient's questions and concerns were addressed.
Erythromycin Counseling:  I discussed with the patient the risks of erythromycin including but not limited to GI upset, allergic reaction, drug rash, diarrhea, increase in liver enzymes, and yeast infections.
Cibinqo Counseling: I discussed with the patient the risks of Cibinqo therapy including but not limited to common cold, nausea, headache, cold sores, increased blood CPK levels, dizziness, UTIs, fatigue, acne, and vomitting. Live vaccines should be avoided.  This medication has been linked to serious infections; higher rate of mortality; malignancy and lymphoproliferative disorders; major adverse cardiovascular events; thrombosis; thrombocytopenia and lymphopenia; lipid elevations; and retinal detachment.
Klisyri Pregnancy And Lactation Text: It is unknown if this medication can harm a developing fetus or if it is excreted in breast milk.
Itraconazole Counseling:  I discussed with the patient the risks of itraconazole including but not limited to liver damage, nausea/vomiting, neuropathy, and severe allergy.  The patient understands that this medication is best absorbed when taken with acidic beverages such as non-diet cola or ginger ale.  The patient understands that monitoring is required including baseline LFTs and repeat LFTs at intervals.  The patient understands that they are to contact us or the primary physician if concerning signs are noted.
Soolantra Pregnancy And Lactation Text: This medication is Pregnancy Category C. This medication is considered safe during breast feeding.
Propranolol Counseling:  I discussed with the patient the risks of propranolol including but not limited to low heart rate, low blood pressure, low blood sugar, restlessness and increased cold sensitivity. They should call the office if they experience any of these side effects.
Sotyktu Pregnancy And Lactation Text: There is insufficient data to evaluate whether or not Sotyktu is safe to use during pregnancy.   It is not known if Sotyktu passes into breast milk and whether or not it is safe to use when breastfeeding.  
Topical Metronidazole Pregnancy And Lactation Text: This medication is Pregnancy Category B and considered safe during pregnancy.  It is also considered safe to use while breastfeeding.
Clofazimine Counseling:  I discussed with the patient the risks of clofazimine including but not limited to skin and eye pigmentation, liver damage, nausea/vomiting, gastrointestinal bleeding and allergy.
Glycopyrrolate Pregnancy And Lactation Text: This medication is Pregnancy Category B and is considered safe during pregnancy. It is unknown if it is excreted breast milk.
Erythromycin Pregnancy And Lactation Text: This medication is Pregnancy Category B and is considered safe during pregnancy. It is also excreted in breast milk.
Sarecycline Counseling: Patient advised regarding possible photosensitivity and discoloration of the teeth, skin, lips, tongue and gums.  Patient instructed to avoid sunlight, if possible.  When exposed to sunlight, patients should wear protective clothing, sunglasses, and sunscreen.  The patient was instructed to call the office immediately if the following severe adverse effects occur:  hearing changes, easy bruising/bleeding, severe headache, or vision changes.  The patient verbalized understanding of the proper use and possible adverse effects of sarecycline.  All of the patient's questions and concerns were addressed.
Enbrel Counseling:  I discussed with the patient the risks of etanercept including but not limited to myelosuppression, immunosuppression, autoimmune hepatitis, demyelinating diseases, lymphoma, and infections.  The patient understands that monitoring is required including a PPD at baseline and must alert us or the primary physician if symptoms of infection or other concerning signs are noted.
Zoryve Counseling:  I discussed with the patient that Zoryve is not for use in the eyes, mouth or vagina. The most commonly reported side effects include diarrhea, headache, insomnia, application site pain, upper respiratory tract infections, and urinary tract infections.  All of the patient's questions and concerns were addressed.
Doxepin Pregnancy And Lactation Text: This medication is Pregnancy Category C and it isn't known if it is safe during pregnancy. It is also excreted in breast milk and breast feeding isn't recommended.
Adbry Pregnancy And Lactation Text: It is unknown if this medication will adversely affect pregnancy or breast feeding.
High Dose Vitamin A Counseling: Side effects reviewed, pt to contact office should one occur.
Tremfya Counseling: I discussed with the patient the risks of guselkumab including but not limited to immunosuppression, serious infections, and drug reactions.  The patient understands that monitoring is required including a PPD at baseline and must alert us or the primary physician if symptoms of infection or other concerning signs are noted.
Cyclophosphamide Counseling:  I discussed with the patient the risks of cyclophosphamide including but not limited to hair loss, hormonal abnormalities, decreased fertility, abdominal pain, diarrhea, nausea and vomiting, bone marrow suppression and infection. The patient understands that monitoring is required while taking this medication.
Birth Control Pills Pregnancy And Lactation Text: This medication should be avoided if pregnant and for the first 30 days post-partum.
Spironolactone Counseling: Patient advised regarding risks of diarrhea, abdominal pain, hyperkalemia, birth defects (for female patients), liver toxicity and renal toxicity. The patient may need blood work to monitor liver and kidney function and potassium levels while on therapy. The patient verbalized understanding of the proper use and possible adverse effects of spironolactone.  All of the patient's questions and concerns were addressed.
Simponi Counseling:  I discussed with the patient the risks of golimumab including but not limited to myelosuppression, immunosuppression, autoimmune hepatitis, demyelinating diseases, lymphoma, and serious infections.  The patient understands that monitoring is required including a PPD at baseline and must alert us or the primary physician if symptoms of infection or other concerning signs are noted.
Minoxidil Counseling: Minoxidil is a topical medication which can increase blood flow where it is applied. It is uncertain how this medication increases hair growth. Side effects are uncommon and include stinging and allergic reactions.
Bactrim Counseling:  I discussed with the patient the risks of sulfa antibiotics including but not limited to GI upset, allergic reaction, drug rash, diarrhea, dizziness, photosensitivity, and yeast infections.  Rarely, more serious reactions can occur including but not limited to aplastic anemia, agranulocytosis, methemoglobinemia, blood dyscrasias, liver or kidney failure, lung infiltrates or desquamative/blistering drug rashes.
Protopic Pregnancy And Lactation Text: This medication is Pregnancy Category C. It is unknown if this medication is excreted in breast milk when applied topically.
Topical Retinoid counseling:  Patient advised to apply a pea-sized amount only at bedtime and wait 30 minutes after washing their face before applying.  If too drying, patient may add a non-comedogenic moisturizer. The patient verbalized understanding of the proper use and possible adverse effects of retinoids.  All of the patient's questions and concerns were addressed.
Carac Counseling:  I discussed with the patient the risks of Carac including but not limited to erythema, scaling, itching, weeping, crusting, and pain.
Xeljanz Counseling: I discussed with the patient the risks of Xeljanz therapy including increased risk of infection, liver issues, headache, diarrhea, or cold symptoms. Live vaccines should be avoided. They were instructed to call if they have any problems.
Olanzapine Counseling- I discussed with the patient the common side effects of olanzapine including but are not limited to: lack of energy, dry mouth, increased appetite, sleepiness, tremor, constipation, dizziness, changes in behavior, or restlessness.  Explained that teenagers are more likely to experience headaches, abdominal pain, pain in the arms or legs, tiredness, and sleepiness.  Serious side effects include but are not limited: increased risk of death in elderly patients who are confused, have memory loss, or dementia-related psychosis; hyperglycemia; increased cholesterol and triglycerides; and weight gain.
Cibinqo Pregnancy And Lactation Text: It is unknown if this medication will adversely affect pregnancy or breast feeding.  You should not take this medication if you are currently pregnant or planning a pregnancy or while breastfeeding.
Propranolol Pregnancy And Lactation Text: This medication is Pregnancy Category C and it isn't known if it is safe during pregnancy. It is excreted in breast milk.
Metronidazole Counseling:  I discussed with the patient the risks of metronidazole including but not limited to seizures, nausea/vomiting, a metallic taste in the mouth, nausea/vomiting and severe allergy.
Topical Steroids Counseling: I discussed with the patient that prolonged use of topical steroids can result in the increased appearance of superficial blood vessels (telangiectasias), lightening (hypopigmentation) and thinning of the skin (atrophy).  Patient understands to avoid using high potency steroids in skin folds, the groin or the face.  The patient verbalized understanding of the proper use and possible adverse effects of topical steroids.  All of the patient's questions and concerns were addressed.
Hydroxychloroquine Counseling:  I discussed with the patient that a baseline ophthalmologic exam is needed at the start of therapy and every year thereafter while on therapy. A CBC may also be warranted for monitoring.  The side effects of this medication were discussed with the patient, including but not limited to agranulocytosis, aplastic anemia, seizures, rashes, retinopathy, and liver toxicity. Patient instructed to call the office should any adverse effect occur.  The patient verbalized understanding of the proper use and possible adverse effects of Plaquenil.  All the patient's questions and concerns were addressed.
Bimzelx Counseling:  I discussed with the patient the risks of Bimzelx including but not limited to depression, immunosuppression, allergic reactions and infections.  The patient understands that monitoring is required including a PPD at baseline and must alert us or the primary physician if symptoms of infection or other concerning signs are noted.
Xelcarolinz Pregnancy And Lactation Text: This medication is Pregnancy Category D and is not considered safe during pregnancy.  The risk during breast feeding is also uncertain.
Olanzapine Pregnancy And Lactation Text: This medication is pregnancy category C.   There are no adequate and well controlled trials with olanzapine in pregnant females.  Olanzapine should be used during pregnancy only if the potential benefit justifies the potential risk to the fetus.   In a study in lactating healthy women, olanzapine was excreted in breast milk.  It is recommended that women taking olanzapine should not breast feed.
SSKI Counseling:  I discussed with the patient the risks of SSKI including but not limited to thyroid abnormalities, metallic taste, GI upset, fever, headache, acne, arthralgias, paraesthesias, lymphadenopathy, easy bleeding, arrhythmias, and allergic reaction.
Topical Sulfur Applications Counseling: Topical Sulfur Counseling: Patient counseled that this medication may cause skin irritation or allergic reactions.  In the event of skin irritation, the patient was advised to reduce the amount of the drug applied or use it less frequently.   The patient verbalized understanding of the proper use and possible adverse effects of topical sulfur application.  All of the patient's questions and concerns were addressed.
Hydroxyzine Counseling: Patient advised that the medication is sedating and not to drive a car after taking this medication.  Patient informed of potential adverse effects including but not limited to dry mouth, urinary retention, and blurry vision.  The patient verbalized understanding of the proper use and possible adverse effects of hydroxyzine.  All of the patient's questions and concerns were addressed.
Cyclophosphamide Pregnancy And Lactation Text: This medication is Pregnancy Category D and it isn't considered safe during pregnancy. This medication is excreted in breast milk.
High Dose Vitamin A Pregnancy And Lactation Text: High dose vitamin A therapy is contraindicated during pregnancy and breast feeding.
Cyclosporine Counseling:  I discussed with the patient the risks of cyclosporine including but not limited to hypertension, gingival hyperplasia,myelosuppression, immunosuppression, liver damage, kidney damage, neurotoxicity, lymphoma, and serious infections. The patient understands that monitoring is required including baseline blood pressure, CBC, CMP, lipid panel and uric acid, and then 1-2 times monthly CMP and blood pressure.
Dutasteride Male Counseling: Dutasteride Counseling:  I discussed with the patient the risks of use of dutasteride including but not limited to decreased libido, decreased ejaculate volume, and gynecomastia. Women who can become pregnant should not handle medication.  All of the patient's questions and concerns were addressed.
Spironolactone Pregnancy And Lactation Text: This medication can cause feminization of the male fetus and should be avoided during pregnancy. The active metabolite is also found in breast milk.
Tetracycline Counseling: Patient counseled regarding possible photosensitivity and increased risk for sunburn.  Patient instructed to avoid sunlight, if possible.  When exposed to sunlight, patients should wear protective clothing, sunglasses, and sunscreen.  The patient was instructed to call the office immediately if the following severe adverse effects occur:  hearing changes, easy bruising/bleeding, severe headache, or vision changes.  The patient verbalized understanding of the proper use and possible adverse effects of tetracycline.  All of the patient's questions and concerns were addressed. Patient understands to avoid pregnancy while on therapy due to potential birth defects.
Ilumya Counseling: I discussed with the patient the risks of tildrakizumab including but not limited to immunosuppression, malignancy, posterior leukoencephalopathy syndrome, and serious infections.  The patient understands that monitoring is required including a PPD at baseline and must alert us or the primary physician if symptoms of infection or other concerning signs are noted.
Eucrisa Counseling: Patient may experience a mild burning sensation during topical application. Eucrisa is not approved in children less than 3 months of age.
Xolair Counseling:  Patient informed of potential adverse effects including but not limited to fever, muscle aches, rash and allergic reactions.  The patient verbalized understanding of the proper use and possible adverse effects of Xolair.  All of the patient's questions and concerns were addressed.
Bactrim Pregnancy And Lactation Text: This medication is Pregnancy Category D and is known to cause fetal risk.  It is also excreted in breast milk.
Qbrexza Counseling:  I discussed with the patient the risks of Qbrexza including but not limited to headache, mydriasis, blurred vision, dry eyes, nasal dryness, dry mouth, dry throat, dry skin, urinary hesitation, and constipation.  Local skin reactions including erythema, burning, stinging, and itching can also occur.
Litfulo Counseling: I discussed with the patient the risks of Litfulo therapy including but not limited to upper respiratory tract infections, shingles, cold sores, and nausea. Live vaccines should be avoided.  This medication has been linked to serious infections; higher rate of mortality; malignancy and lymphoproliferative disorders; major adverse cardiovascular events; thrombosis; gastrointestinal perforations; neutropenia; lymphopenia; anemia; liver enzyme elevations; and lipid elevations.
Ketoconazole Counseling:   Patient counseled regarding improving absorption with orange juice.  Adverse effects include but are not limited to breast enlargement, headache, diarrhea, nausea, upset stomach, liver function test abnormalities, taste disturbance, and stomach pain.  There is a rare possibility of liver failure that can occur when taking ketoconazole. The patient understands that monitoring of LFTs may be required, especially at baseline. The patient verbalized understanding of the proper use and possible adverse effects of ketoconazole.  All of the patient's questions and concerns were addressed.
Sski Pregnancy And Lactation Text: This medication is Pregnancy Category D and isn't considered safe during pregnancy. It is excreted in breast milk.
Mirvaso Counseling: Mirvaso is a topical medication which can decrease superficial blood flow where applied. Side effects are uncommon and include stinging, redness and allergic reactions.
Ketoconazole Pregnancy And Lactation Text: This medication is Pregnancy Category C and it isn't know if it is safe during pregnancy. It is also excreted in breast milk and breast feeding isn't recommended.
Qbrexza Pregnancy And Lactation Text: There is no available data on Qbrexza use in pregnant women.  There is no available data on Qbrexza use in lactation.
Cephalexin Counseling: I counseled the patient regarding use of cephalexin as an antibiotic for prophylactic and/or therapeutic purposes. Cephalexin (commonly prescribed under brand name Keflex) is a cephalosporin antibiotic which is active against numerous classes of bacteria, including most skin bacteria. Side effects may include nausea, diarrhea, gastrointestinal upset, rash, hives, yeast infections, and in rare cases, hepatitis, kidney disease, seizures, fever, confusion, neurologic symptoms, and others. Patients with severe allergies to penicillin medications are cautioned that there is about a 10% incidence of cross-reactivity with cephalosporins. When possible, patients with penicillin allergies should use alternatives to cephalosporins for antibiotic therapy.
Xolair Pregnancy And Lactation Text: This medication is Pregnancy Category B and is considered safe during pregnancy. This medication is excreted in breast milk.
Colchicine Counseling:  Patient counseled regarding adverse effects including but not limited to stomach upset (nausea, vomiting, stomach pain, or diarrhea).  Patient instructed to limit alcohol consumption while taking this medication.  Colchicine may reduce blood counts especially with prolonged use.  The patient understands that monitoring of kidney function and blood counts may be required, especially at baseline. The patient verbalized understanding of the proper use and possible adverse effects of colchicine.  All of the patient's questions and concerns were addressed.
Topical Steroids Applications Pregnancy And Lactation Text: Most topical steroids are considered safe to use during pregnancy and lactation.  Any topical steroid applied to the breast or nipple should be washed off before breastfeeding.
Metronidazole Pregnancy And Lactation Text: This medication is Pregnancy Category B and considered safe during pregnancy.  It is also excreted in breast milk.
Litfulo Pregnancy And Lactation Text: Based on animal studies, Lifulo may cause embryo-fetal harm when administered to pregnant women.  The medication should not be used in pregnancy.  Breastfeeding is not recommended during treatment.
Tazorac Counseling:  Patient advised that medication is irritating and drying.  Patient may need to apply sparingly and wash off after an hour before eventually leaving it on overnight.  The patient verbalized understanding of the proper use and possible adverse effects of tazorac.  All of the patient's questions and concerns were addressed.
Oral Minoxidil Counseling- I discussed with the patient the risks of oral minoxidil including but not limited to shortness of breath, swelling of the feet or ankles, dizziness, lightheadedness, unwanted hair growth and allergic reaction.  The patient verbalized understanding of the proper use and possible adverse effects of oral minoxidil.  All of the patient's questions and concerns were addressed.
Zyclara Counseling:  I discussed with the patient the risks of imiquimod including but not limited to erythema, scaling, itching, weeping, crusting, and pain.  Patient understands that the inflammatory response to imiquimod is variable from person to person and was educated regarded proper titration schedule.  If flu-like symptoms develop, patient knows to discontinue the medication and contact us.
Topical Sulfur Applications Pregnancy And Lactation Text: This medication is considered safe during pregnancy and breast feeding secondary to limited systemic absorption.
Bimzelx Pregnancy And Lactation Text: This medication crosses the placenta and the safety is uncertain during pregnancy. It is unknown if this medication is present in breast milk.
Hydroxyzine Pregnancy And Lactation Text: This medication is not safe during pregnancy and should not be taken. It is also excreted in breast milk and breast feeding isn't recommended.
Hydroxychloroquine Pregnancy And Lactation Text: This medication has been shown to cause fetal harm but it isn't assigned a Pregnancy Risk Category. There are small amounts excreted in breast milk.
Calcipotriene Pregnancy And Lactation Text: The use of this medication during pregnancy or lactation is not recommended as there is insufficient data.
Cimzia Counseling:  I discussed with the patient the risks of Cimzia including but not limited to immunosuppression, allergic reactions and infections.  The patient understands that monitoring is required including a PPD at baseline and must alert us or the primary physician if symptoms of infection or other concerning signs are noted.
Dutasteride Female Counseling: Dutasteride Counseling:  I discussed with the patient the risks of use of dutasteride including but not limited to decreased libido and sexual dysfunction. Explained the teratogenic nature of the medication and stressed the importance of not getting pregnant during treatment. All of the patient's questions and concerns were addressed.
Calcipotriene Counseling:  I discussed with the patient the risks of calcipotriene including but not limited to erythema, scaling, itching, and irritation.
Skyrizi Counseling: I discussed with the patient the risks of risankizumab-rzaa including but not limited to immunosuppression, and serious infections.  The patient understands that monitoring is required including a PPD at baseline and must alert us or the primary physician if symptoms of infection or other concerning signs are noted.

## 2024-03-18 NOTE — PROCEDURE: LIQUID NITROGEN
Show Applicator Variable?: Yes
Render Post-Care Instructions In Note?: no
Number Of Freeze-Thaw Cycles: 2 freeze-thaw cycles
Post-Care Instructions: I reviewed with the patient in detail post-care instructions. Patient is to wear sunprotection, and avoid picking at any of the treated lesions. Pt may apply Vaseline to crusted or scabbing areas.
Spray Paint Text: The liquid nitrogen was applied to the skin utilizing a spray paint frosting technique.
Medical Necessity Information: It is in your best interest to select a reason for this procedure from the list below. All of these items fulfill various CMS LCD requirements except the new and changing color options.
Medical Necessity Clause: This procedure was medically necessary because the lesions that were treated were:
Consent: The patient's consent was obtained including but not limited to risks of crusting, scabbing, blistering, scarring, darker or lighter pigmentary change, recurrence, incomplete removal and infection.
Detail Level: Detailed
Duration Of Freeze Thaw-Cycle (Seconds): 5-10
Application Tool (Optional): Liquid Nitrogen Sprayer

## 2024-03-18 NOTE — PROCEDURE: PRESCRIPTION MEDICATION MANAGEMENT
Render In Strict Bullet Format?: No
Initiate Treatment: Retin-A 0.025 % topical cream: Apply a thin layer QHS to face
Detail Level: Zone

## 2024-03-19 ENCOUNTER — RX ONLY (OUTPATIENT)
Age: 39
Setting detail: RX ONLY
End: 2024-03-19

## 2024-03-19 RX ORDER — TRETINOIN 0.25 MG/G
CREAM TOPICAL
Qty: 45 | Refills: 3 | Status: ERX | COMMUNITY
Start: 2024-03-19

## 2024-05-02 LAB
CASE RPRT: NORMAL
CLINICAL INFO: NORMAL
IMMUNE STAIN STUDY: NORMAL
PATH REPORT.ADDENDUM SPEC: NORMAL
PATH REPORT.FINAL DX SPEC: NORMAL
PATH REPORT.GROSS SPEC: NORMAL

## (undated) DEVICE — DRAPE ARM DAVINCI XI

## (undated) DEVICE — TUBING SMOKE EVAC PENCIL COATED

## (undated) DEVICE — HEMOSTAT SURGICEL ABSORBABLE 4 X 8

## (undated) DEVICE — TRAY URINE METER FOLEY NON-SILVER

## (undated) DEVICE — PAD GROUND ELECTROSURGICAL W/CORD

## (undated) DEVICE — PAD POSITIONING XL W/ARM PROTECTORS

## (undated) DEVICE — SUTURE BIOSYN 4-0 UNDYED 1X30 P-12

## (undated) DEVICE — SOLN IRRIG .9%SOD 1000ML

## (undated) DEVICE — DRESSING TEGADERM 4X4 3/4

## (undated) DEVICE — SOLUTION ELECTROLUBE ANTI-STICK

## (undated) DEVICE — NEEDLE DISP HYPO 25GX1-1/2IN

## (undated) DEVICE — GLOVE SZ 6.5 PROTEXIS PI

## (undated) DEVICE — APPLICATOR FLOSEAL ENDOSCOPIC

## (undated) DEVICE — STRYKEFLOW 2 W/ DISP TIP

## (undated) DEVICE — TUBING INSUFFLATION PNEUMOSURE HEATED HIGH FLOW

## (undated) DEVICE — SKIN MARKER SURGICAL

## (undated) DEVICE — SEAL UNIVERSAL 5MM-8MM XI

## (undated) DEVICE — MARKER SURGICAL SKIN

## (undated) DEVICE — APPLICATOR CHLORAPREP 26ML ORANGE TINT

## (undated) DEVICE — SUTURE VLOC 2-0 90 UNDYED 1X9 GS-22

## (undated) DEVICE — TOOMEY SYRINGE, 70CC STERILE

## (undated) DEVICE — SYSTEM LABELING CORRECT MEDICATION

## (undated) DEVICE — ADHESIVE SKIN DERMABOND ADVANCED 0.7ML

## (undated) DEVICE — SHEARS TIP COVER DAVINCI ONE USE

## (undated) DEVICE — TUBING PLUM PORT ACTIVE SMOKE EVAC

## (undated) DEVICE — OBTURATOR BLADELESS 8MM XI

## (undated) DEVICE — SUTURE VLOC M2145 2-0 VL 9" GS-22

## (undated) DEVICE — SYSTEM VISUALIZATION CLEARIFY

## (undated) DEVICE — Device

## (undated) DEVICE — CAUTERY DAVINCI SPATULA REPOSABLE

## (undated) DEVICE — GLOVE SZ 7 LINER PROTEXIS PI BL

## (undated) DEVICE — MANIFOLD SINGLE PORT NEPTUNE